# Patient Record
Sex: FEMALE | Race: WHITE | Employment: OTHER | URBAN - METROPOLITAN AREA
[De-identification: names, ages, dates, MRNs, and addresses within clinical notes are randomized per-mention and may not be internally consistent; named-entity substitution may affect disease eponyms.]

---

## 2018-12-05 ENCOUNTER — APPOINTMENT (OUTPATIENT)
Dept: CT IMAGING | Facility: CLINIC | Age: 73
End: 2018-12-05
Attending: EMERGENCY MEDICINE
Payer: MEDICARE

## 2018-12-05 ENCOUNTER — HOSPITAL ENCOUNTER (EMERGENCY)
Facility: CLINIC | Age: 73
Discharge: HOME | End: 2018-12-05
Attending: EMERGENCY MEDICINE | Admitting: EMERGENCY MEDICINE
Payer: MEDICARE

## 2018-12-05 VITALS
DIASTOLIC BLOOD PRESSURE: 80 MMHG | RESPIRATION RATE: 20 BRPM | TEMPERATURE: 97.2 F | SYSTOLIC BLOOD PRESSURE: 146 MMHG | HEART RATE: 68 BPM | OXYGEN SATURATION: 100 %

## 2018-12-05 DIAGNOSIS — R10.12 ABDOMINAL PAIN, LEFT UPPER QUADRANT: ICD-10-CM

## 2018-12-05 DIAGNOSIS — R07.9 CHEST PAIN, UNSPECIFIED TYPE: ICD-10-CM

## 2018-12-05 DIAGNOSIS — K59.00 CONSTIPATION, UNSPECIFIED CONSTIPATION TYPE: ICD-10-CM

## 2018-12-05 LAB
ALBUMIN SERPL-MCNC: 4 G/DL (ref 3.4–5)
ALP SERPL-CCNC: 80 U/L (ref 40–150)
ALT SERPL W P-5'-P-CCNC: 20 U/L (ref 0–50)
ANION GAP SERPL CALCULATED.3IONS-SCNC: 10 MMOL/L (ref 3–14)
AST SERPL W P-5'-P-CCNC: 15 U/L (ref 0–45)
BILIRUB SERPL-MCNC: 0.2 MG/DL (ref 0.2–1.3)
BUN SERPL-MCNC: 16 MG/DL (ref 7–30)
CALCIUM SERPL-MCNC: 8.6 MG/DL (ref 8.5–10.1)
CHLORIDE SERPL-SCNC: 105 MMOL/L (ref 94–109)
CO2 SERPL-SCNC: 22 MMOL/L (ref 20–32)
CREAT BLD-MCNC: 0.8 MG/DL (ref 0.52–1.04)
CREAT SERPL-MCNC: 0.7 MG/DL (ref 0.52–1.04)
ERYTHROCYTE [DISTWIDTH] IN BLOOD BY AUTOMATED COUNT: 14 % (ref 10–15)
GFR SERPL CREATININE-BSD FRML MDRD: 71 ML/MIN/1.7M2
GFR SERPL CREATININE-BSD FRML MDRD: 82 ML/MIN/1.7M2
GLUCOSE SERPL-MCNC: 95 MG/DL (ref 70–99)
HCT VFR BLD AUTO: 41 % (ref 35–47)
HGB BLD-MCNC: 13.2 G/DL (ref 11.7–15.7)
LIPASE SERPL-CCNC: 147 U/L (ref 73–393)
MCH RBC QN AUTO: 31.7 PG (ref 26.5–33)
MCHC RBC AUTO-ENTMCNC: 32.2 G/DL (ref 31.5–36.5)
MCV RBC AUTO: 98 FL (ref 78–100)
PLATELET # BLD AUTO: 348 10E9/L (ref 150–450)
POTASSIUM SERPL-SCNC: 3.5 MMOL/L (ref 3.4–5.3)
PROT SERPL-MCNC: 7.7 G/DL (ref 6.8–8.8)
RBC # BLD AUTO: 4.17 10E12/L (ref 3.8–5.2)
SODIUM SERPL-SCNC: 137 MMOL/L (ref 133–144)
TROPONIN I SERPL-MCNC: <0.015 UG/L (ref 0–0.04)
WBC # BLD AUTO: 7.9 10E9/L (ref 4–11)

## 2018-12-05 PROCEDURE — 93005 ELECTROCARDIOGRAM TRACING: CPT

## 2018-12-05 PROCEDURE — 83690 ASSAY OF LIPASE: CPT | Performed by: EMERGENCY MEDICINE

## 2018-12-05 PROCEDURE — 82565 ASSAY OF CREATININE: CPT | Mod: 91

## 2018-12-05 PROCEDURE — 80053 COMPREHEN METABOLIC PANEL: CPT | Performed by: EMERGENCY MEDICINE

## 2018-12-05 PROCEDURE — 25000128 H RX IP 250 OP 636: Performed by: EMERGENCY MEDICINE

## 2018-12-05 PROCEDURE — 99285 EMERGENCY DEPT VISIT HI MDM: CPT | Mod: 25

## 2018-12-05 PROCEDURE — 84484 ASSAY OF TROPONIN QUANT: CPT | Performed by: EMERGENCY MEDICINE

## 2018-12-05 PROCEDURE — 85027 COMPLETE CBC AUTOMATED: CPT | Performed by: EMERGENCY MEDICINE

## 2018-12-05 PROCEDURE — 71260 CT THORAX DX C+: CPT

## 2018-12-05 PROCEDURE — 96375 TX/PRO/DX INJ NEW DRUG ADDON: CPT

## 2018-12-05 PROCEDURE — 96361 HYDRATE IV INFUSION ADD-ON: CPT

## 2018-12-05 PROCEDURE — 96374 THER/PROPH/DIAG INJ IV PUSH: CPT | Mod: 59

## 2018-12-05 RX ORDER — IOPAMIDOL 755 MG/ML
55 INJECTION, SOLUTION INTRAVASCULAR ONCE
Status: COMPLETED | OUTPATIENT
Start: 2018-12-05 | End: 2018-12-05

## 2018-12-05 RX ORDER — ONDANSETRON 2 MG/ML
4 INJECTION INTRAMUSCULAR; INTRAVENOUS ONCE
Status: COMPLETED | OUTPATIENT
Start: 2018-12-05 | End: 2018-12-05

## 2018-12-05 RX ORDER — HYDROMORPHONE HYDROCHLORIDE 1 MG/ML
0.5 INJECTION, SOLUTION INTRAMUSCULAR; INTRAVENOUS; SUBCUTANEOUS ONCE
Status: COMPLETED | OUTPATIENT
Start: 2018-12-05 | End: 2018-12-05

## 2018-12-05 RX ADMIN — SODIUM CHLORIDE 72 ML: 9 INJECTION, SOLUTION INTRAVENOUS at 20:42

## 2018-12-05 RX ADMIN — SODIUM CHLORIDE 1000 ML: 9 INJECTION, SOLUTION INTRAVENOUS at 20:26

## 2018-12-05 RX ADMIN — Medication 0.5 MG: at 20:25

## 2018-12-05 RX ADMIN — IOPAMIDOL 55 ML: 755 INJECTION, SOLUTION INTRAVENOUS at 20:41

## 2018-12-05 RX ADMIN — ONDANSETRON 4 MG: 2 INJECTION INTRAMUSCULAR; INTRAVENOUS at 20:25

## 2018-12-05 ASSESSMENT — ENCOUNTER SYMPTOMS
NAUSEA: 1
VOMITING: 0
NUMBNESS: 0
ABDOMINAL PAIN: 1
SHORTNESS OF BREATH: 0
WEAKNESS: 0

## 2018-12-05 NOTE — ED AVS SNAPSHOT
[facility] Emergency Department    [address]    [facility] [address]    Phone:  [phone]    Fax:  [phone]                                       Aliyah Gonzalez   MRN: [id number]    Department:  [facility] Emergency Department   Date of Visit:  12/5/2018           After Visit Summary Signature Page     I have received my discharge instructions, and my questions have been answered. I have discussed any challenges I see with this plan with the nurse or doctor.    ..........................................................................................................................................  Patient/Patient Representative Signature      ..........................................................................................................................................  Patient Representative Print Name and Relationship to Patient    ..................................................               ................................................  Date                                   Time    ..........................................................................................................................................  Reviewed by Signature/Title    ...................................................              ..............................................  Date                                               Time          22EPIC Rev 08/18

## 2018-12-05 NOTE — ED AVS SNAPSHOT
[facility] Emergency Department    [address] [place] [address]    Phone:  [phone]    Fax:  [phone]                                       Aliyah Gonzalez   MRN: [id number]    Department:  [facility] Emergency Department   Date of Visit:  12/5/2018           Patient Information     Date Of Birth          1945        Your diagnoses for this visit were:     Chest pain, unspecified type     Abdominal pain, left upper quadrant     Constipation, unspecified constipation type        You were seen by Michele Amador MD.      Follow-up Information     Follow up with Marcia Blackwell MD. Schedule an appointment as soon as possible for a visit in 5 days.    Specialty:  Family Practice    Contact information:    [facility]  [address]   [place] [address]  [phone]          Discharge Instructions       Please take the bottle of magnesium citrate tomorrow to assist with your constipation.  If your stools are not loose, please take MiraLAX 3 times a day until the stool is loose. Then you may return to your normal dose of once daily as needed.    Discharge Instructions  Constipation  Constipation can cause severe cramping pain and your provider thinks this might be the cause of your abdominal pain (belly pain) today.  People usually recognize that they are constipated because they have difficulty having bowel movements, are not having bowel movements frequently enough, or are not having large enough bowel movements. Sometimes, especially in children or older people, you do not recognize that you are constipated until it becomes severe. The most common causes of constipation are a lack of exercise and not eating enough fruits, vegetables, and whole grains. Constipation can also be a side effect of medications, such as narcotics, or may be caused by a disease of the digestive system.    Generally, every Emergency Department visit should  have a follow-up clinic visit with either a primary or a specialty clinic/provider. Please follow-up as instructed by your emergency provider today. Sometimes, chronic constipation requires further testing to determine the cause. If you are over 50 years old, you may need a colonoscopy if you have not had one before.     Return to the Emergency Department if:    Your abdominal pain worsens or does not improve after a bowel movement.    You become very weak.    You get a temperature above 102oF or as directed by your provider.    You have blood in your stools (bright red or black, tarry stools).    You keep vomiting (throwing up) or cannot drink liquids.    Your see blood when you vomit.    Your stomach gets bloated or bigger.    You have new symptoms or anything that worries you.    What can I do to help myself?    If your provider gave you a cathartic medication, like magnesium citrate or GoLytely  (polyethylene glycol), you can expect to have cramps and gas pains after taking it. You can expect to have a number of bowel movements and even diarrhea (loose or watery stools) in the course of clearing your bowels.  You will know your bowels have been cleaned out after you pass clear liquid. The cramps and gas should let up after you have emptied your bowels. You may want to wait until morning to take this type of medication so you aren t up in the night.     Sometimes instead of cathartics, we recommend laxatives like milk of magnesia to move your bowels more slowly, or an enema to help the bowels to move. Read and follow the package directions, or follow your provider s instructions.    Once you have become very constipated, it takes time for your bowels to return to normal and you need to be very careful to prevent becoming constipated again. Take a laxative if you do not move your bowels at least every two days.       Eat foods that have a lot of fiber. Good choices are fruits, vegetables, prune juice, apple juice,  and high fiber cereal. Limit dairy products such as milk and cheese, since these can make constipation worse.     Drink plenty of water.     When you feel the need to go to the bathroom, go to the bathroom. Do not hold it.    Miralax , Metamucil , Colace , Senna or fiber supplements can be used daily.  Miralax  daily is often the best choice for children.  If you were given a prescription for medicine here today, be sure to read all of the information (including the package insert) that comes with your prescription.  This will include important information about the medicine, its side effects, and any warnings that you need to know about.  The pharmacist who fills the prescription can provide more information and answer questions you may have about the medicine.  If you have questions or concerns that the pharmacist cannot address, please call or return to the Emergency Department.   Remember that you can always come back to the Emergency Department if you are not able to see your regular provider in the amount of time listed above, if you get any new symptoms, or if there is anything that worries you.    Discharge Instructions  Chest Pain    You have been seen today for chest pain or discomfort.  At this time, your provider has found no signs that your chest pain is due to a serious or life-threatening condition, (or you have declined more testing and/or admission to the hospital). However, sometimes there is a serious problem that does not show up right away. Your evaluation today may not be complete and you may need further testing and evaluation.     Generally, every Emergency Department visit should have a follow-up clinic visit with either a primary or a specialty clinic/provider. Please follow-up as instructed by your emergency provider today.  Return to the Emergency Department if:    Your chest pain changes, gets worse, starts to happen more often, or comes with less activity.    You are newly short of  breath.    You get very weak or tired.    You pass out or faint.    You have any new symptoms, like fever, cough, numb legs, or you cough up blood.    You have anything else that worries you.    Until you follow-up with your regular provider, please do the following:    Take one aspirin daily unless you have an allergy or are told not to by your provider.    If a stress test appointment has been made, go to the appointment.    If you have questions, contact your regular provider.    Follow-up with your regular provider/clinic as directed; this is very important.    If you were given a prescription for medicine here today, be sure to read all of the information (including the package insert) that comes with your prescription.  This will include important information about the medicine, its side effects, and any warnings that you need to know about.  The pharmacist who fills the prescription can provide more information and answer questions you may have about the medicine.  If you have questions or concerns that the pharmacist cannot address, please call or return to the Emergency Department.       Remember that you can always come back to the Emergency Department if you are not able to see your regular provider in the amount of time listed above, if you get any new symptoms, or if there is anything that worries you.  Discharge Instructions  Abdominal Pain    Abdominal pain (belly pain) can be caused by many things. Your evaluation today does not show the exact cause for your pain. Your provider today has decided that it is unlikely your pain is due to a life threatening problem, or a problem requiring surgery or hospital admission. Sometimes those problems cannot be found right away, so it is very important that you follow up as directed.  Sometimes only the changes which occur over time allow the cause of your pain to be found.    Generally, every Emergency Department visit should have a follow-up clinic visit with  either a primary or a specialty clinic/provider. Please follow-up as instructed by your emergency provider today. With abdominal pain, we often recommend very close follow-up, such as the following day.    ADULTS:  Return to the Emergency Department right away if:      You get an oral temperature above 102oF or as directed by your provider.    You have blood in your stools. This may be bright red or appear as black, tarry stools.      You keep vomiting (throwing up) or cannot drink liquids.    You see blood when you vomit.     You cannot have a bowel movement or you cannot pass gas.    Your stomach gets bloated or bigger.    Your skin or the whites of your eyes look yellow.    You faint.    You have bloody, frequent or painful urination (peeing).    You have new symptoms or anything that worries you.    CHILDREN:  Return to the Emergency Department right away if your child has any of the above-listed symptoms or the following:      Pushes your hand away or screams/cries when his/her belly is touched.    You notice your child is very fussy or weak.    Your child is very tired and is too tired to eat or drink.    Your child is dehydrated.  Signs of dehydration can be:  o Significant change in the amount of wet diapers/urine.  o Your infant or child starts to have dry mouth and lips, or no saliva (spit) or tears.    PREGNANT WOMEN:  Return to the Emergency Department right away if you have any of the above-listed symptoms or the following:      You have bleeding, leaking fluid or passing tissue from the vagina.    You have worse pain or cramping, or pain in your shoulder or back.    You have vomiting that will not stop.    You have a temperature of 100oF or more.    Your baby is not moving as much as usual.    You faint.    You get a bad headache with or without eye problems and abdominal pain.    You have a seizure.    You have unusual discharge from your vagina and abdominal pain.    Abdominal pain is pretty common  "during pregnancy.  Your pain may or may not be related to your pregnancy. You should follow-up closely with your OB provider so they can evaluate you and your baby.  Until you follow-up with your regular provider, do the following:       Avoid sex and do not put anything in your vagina.    Drink clear fluids.    Only take medications approved by your provider.    MORE INFORMATION:    Appendicitis:  A possible cause of abdominal pain in any person who still has their appendix is acute appendicitis. Appendicitis is often hard to diagnose.  Testing does not always rule out early appendicitis or other causes of abdominal pain. Close follow-up with your provider and re-evaluations may be needed to figure out the reason for your abdominal pain.    Follow-up:  It is very important that you make an appointment with your clinic and go to the appointment.  If you do not follow-up with your primary provider, it may result in missing an important development which could result in permanent injury or disability and/or lasting pain.  If there is any problem keeping your appointment, call your provider or return to the Emergency Department.    Medications:  Take your medications as directed by your provider today.  Before using over-the-counter medications, ask your provider and make sure to take the medications as directed.  If you have any questions about medications, ask your provider.    Diet:  Resume your normal diet as much as possible, but do not eat fried, fatty or spicy foods while you have pain.  Do not drink alcohol or have caffeine.  Do not smoke tobacco.    Probiotics: If you have been given an antibiotic, you may want to also take a probiotic pill or eat yogurt with live cultures. Probiotics have \"good bacteria\" to help your intestines stay healthy. Studies have shown that probiotics help prevent diarrhea (loose stools) and other intestine problems (including C. diff infection) when you take antibiotics. You can buy " these without a prescription in the pharmacy section of the store.     If you were given a prescription for medicine here today, be sure to read all of the information (including the package insert) that comes with your prescription.  This will include important information about the medicine, its side effects, and any warnings that you need to know about.  The pharmacist who fills the prescription can provide more information and answer questions you may have about the medicine.  If you have questions or concerns that the pharmacist cannot address, please call or return to the Emergency Department.       Remember that you can always come back to the Emergency Department if you are not able to see your regular provider in the amount of time listed above, if you get any new symptoms, or if there is anything that worries you.      24 Hour Appointment Hotline       To make an appointment at any [facility], call [phone] ([phone]). If you don't have a family doctor or clinic, we will help you find one. [facility] are conveniently located to serve the needs of you and your family.             Review of your medicines      START taking        Dose / Directions Last dose taken    magnesium citrate solution   Dose:  296 mL   Quantity:  296 mL        Take 296 mLs by mouth once for 1 dose   Refills:  0          Our records show that you are taking the medicines listed below. If these are incorrect, please call your family doctor or clinic.        Dose / Directions Last dose taken    acetaminophen 500 MG tablet   Commonly known as:  TYLENOL   Dose:  1000 mg   Quantity:  100 tablet        Take 2 tablets by mouth every 8 hours.   Refills:  0        ATIVAN 0.5 MG tablet   Dose:  0.5 mg   Generic drug:  LORazepam        Take 0.5 mg by mouth every 8 hours as needed.   Refills:  0        CHOLECALCIFEROL        Refills:  0        colchicine 0.6 MG tablet   Commonly known as:  COLCRYS   Quantity:  30 tablet         Take 2 tablets at the first sign of flare, take 1 additional tablet one hour later.   Refills:  0        cycloSPORINE 0.05 % ophthalmic emulsion   Commonly known as:  RESTASIS   Dose:  3 drop        Place 3 drops into both eyes 2 times daily.   Refills:  0        DIFLUCAN 200 MG tablet   Dose:  200 mg   Generic drug:  fluconazole        Take 200 mg by mouth every 7 days. Last filled at [facility] on 6/19/12, #4. Takes on Fridays.   Refills:  0        dihydroergotamine 1 MG/ML injection   Commonly known as:  DHE   Dose:  0.5-1 mg        Inject 0.5-1 mg into the muscle once as needed. May repeat in => 1 hour, max 3 mg/24 hours   Refills:  0        EFFEXOR PO        Take  by mouth 3 times daily.   Refills:  0        FLEXERIL PO        Take  by mouth.   Refills:  0        gabapentin 300 MG capsule   Commonly known as:  NEURONTIN   Dose:  600 mg        Take 600 mg by mouth 2 times daily. Per [facility] pharmacy, Rx written for 3 x 300 mg capsules qAM, 3 x 300 mg capsules at noon, and 4 x 300 mg capsules qHS.   Refills:  0        GAS-X PO   Dose:  125 mg        Take 125 mg by mouth as needed.   Refills:  0        HYDROcodone-acetaminophen 5-325 MG tablet   Commonly known as:  NORCO   Dose:  1-2 tablet   Quantity:  15 tablet        Take 1-2 tablets by mouth every 4 hours as needed for moderate to severe pain   Refills:  0        LEXAPRO PO   Dose:  50 mg        Take 50 mg by mouth daily.   Refills:  0        lisinopril 20 MG tablet   Commonly known as:  PRINIVIL/ZESTRIL   Dose:  20 mg        Take 20 mg by mouth daily.   Refills:  0        OMEGA-3 FISH OIL PO        Take  by mouth.   Refills:  0        oxyCODONE-acetaminophen 5-325 MG tablet   Commonly known as:  PERCOCET   Dose:  1 tablet   Quantity:  15 tablet        Take 1 tablet by mouth every 6 hours as needed for moderate to severe pain   Refills:  0        pantoprazole 40 MG EC tablet   Commonly known as:  PROTONIX   Dose:  40 mg        Take 40 mg by mouth every  morning (before breakfast).   Refills:  0        propranolol 20 MG tablet   Commonly known as:  INDERAL   Dose:  20 mg        Take 20 mg by mouth 4 times daily as needed. For tremors   Refills:  0        traMADol 50 MG tablet   Commonly known as:  ULTRAM   Dose:  50 mg        Take 50 mg by mouth every 6 hours as needed.   Refills:  0        traZODone 150 MG tablet   Commonly known as:  DESYREL   Dose:  150 mg        Take 150 mg by mouth nightly as needed.   Refills:  0        * vitamin B-12 1000 MCG/ML injection   Commonly known as:  CYANOCOBALAMIN   Dose:  1 mL        Inject 1 mL as directed every 30 days. Per patient, Due at the end of the month   Refills:  0        * VITAMIN B 12 PO        Take  by mouth.   Refills:  0        ZOFRAN PO        Take  by mouth.   Refills:  0        * Notice:  This list has 2 medication(s) that are the same as other medications prescribed for you. Read the directions carefully, and ask your doctor or other care provider to review them with you.            Prescriptions were sent or printed at these locations (1 Prescription)                   Other Prescriptions                Printed at Department/Unit printer (1 of 1)         magnesium citrate solution                Procedures and tests performed during your visit     CBC (platelets, no diff)    CT Chest (PE) Abdomen Pelvis w Contrast    Comprehensive metabolic panel    Creatinine POCT    EKG 12 lead    ISTAT creatinine    Lipase    Troponin I      Orders Needing Specimen Collection     None      Pending Results     Date and Time Order Name Status Description    12/5/2018 1957 EKG 12 lead Preliminary             Pending Culture Results     No orders found from 12/3/2018 to 12/6/2018.            Pending Results Instructions     If you had any lab results that were not finalized at the time of your Discharge, you can call the ED Lab Result RN at [phone]. You will be contacted by this team for any positive Lab results or changes  in treatment. The nurses are available 7 days a week from 10A to 6:30P.  You can leave a message 24 hours per day and they will return your call.        Test Results From Your Hospital Stay        12/5/2018  8:52 PM      Component Results     Component Value Ref Range & Units Status    Sodium 137 133 - 144 mmol/L Final    Potassium 3.5 3.4 - 5.3 mmol/L Final    Chloride 105 94 - 109 mmol/L Final    Carbon Dioxide 22 20 - 32 mmol/L Final    Anion Gap 10 3 - 14 mmol/L Final    Glucose 95 70 - 99 mg/dL Final    Urea Nitrogen 16 7 - 30 mg/dL Final    Creatinine 0.70 0.52 - 1.04 mg/dL Final    GFR Estimate 82 >60 mL/min/1.7m2 Final    Non  GFR Calc    GFR Estimate If Black >90 >60 mL/min/1.7m2 Final    African American GFR Calc    Calcium 8.6 8.5 - 10.1 mg/dL Final    Bilirubin Total 0.2 0.2 - 1.3 mg/dL Final    Albumin 4.0 3.4 - 5.0 g/dL Final    Protein Total 7.7 6.8 - 8.8 g/dL Final    Alkaline Phosphatase 80 40 - 150 U/L Final    ALT 20 0 - 50 U/L Final    AST 15 0 - 45 U/L Final         12/5/2018  8:52 PM      Component Results     Component Value Ref Range & Units Status    Lipase 147 73 - 393 U/L Final         12/5/2018  8:37 PM      Component Results     Component Value Ref Range & Units Status    WBC 7.9 4.0 - 11.0 10e9/L Final    RBC Count 4.17 3.8 - 5.2 10e12/L Final    Hemoglobin 13.2 11.7 - 15.7 g/dL Final    Hematocrit 41.0 35.0 - 47.0 % Final    MCV 98 78 - 100 fl Final    MCH 31.7 26.5 - 33.0 pg Final    MCHC 32.2 31.5 - 36.5 g/dL Final    RDW 14.0 10.0 - 15.0 % Final    Platelet Count 348 150 - 450 10e9/L Final               12/5/2018  8:36 PM      Component Results     Component Value Ref Range & Units Status    Creatinine 0.8 0.52 - 1.04 mg/dL Final    GFR Estimate 71 >60 mL/min/1.7m2 Final    GFR Estimate If Black 86 >60 mL/min/1.7m2 Final         12/5/2018  9:34 PM      Narrative     CT CHEST PE ABDOMEN AND PELVIS WITH CONTRAST   12/5/2018 8:50 PM     HISTORY: Left lower chest pain  and left upper quadrant abdominal pain.    TECHNIQUE:  55 mL Isovue-370. Radiation dose for this scan was reduced  using automated exposure control, adjustment of the mA and/or kV  according to patient size, or iterative reconstruction technique.    COMPARISON: 6/28/2012    FINDINGS:     Chest: No evidence of pulmonary embolism. Aorta is not opacified above  the level of left atrium and so aortic dissection cannot be ruled out  in that area, but there is no evidence of dissection at the root of  the aorta or in the more distal descending aorta. Mild groundglass  density and linear atelectasis is seen at right lung base and  otherwise lungs are clear. The heart and mediastinum appear normal.    Abdomen: Large amount of stool in the colon consistent with  constipation. Normal appearing liver. Intrahepatic and extrahepatic  biliary dilatation status post cholecystectomy. Normal spleen,  pancreas, left kidney and adrenal glands. There is a 1 cm cyst off the  lateral aspect of the lower pole right kidney and otherwise the right  kidney appears normal. No adenopathy, free air or free fluid.  Postoperative changes in the stomach and small bowel suggest gastric  bypass and Sherrell-en-Y anastomosis. Otherwise the small bowel is  unremarkable.    Pelvis: Bladder appears normal. Previous hysterectomy. Ovaries are not  identified. Lumbar degenerative changes and scoliosis.        Impression     IMPRESSION:  1. Large amount of stool in the colon consistent with constipation.  2. Right lower pole renal cyst.  3. Postoperative changes in the stomach and small bowel suggest  gastric bypass Sherrell-en-Y anastomosis.  4. Cholecystectomy and hysterectomy.  5. No evidence of pulmonary embolism.    CONSTANTIN DONG MD         12/5/2018  9:19 PM      Component Results     Component Value Ref Range & Units Status    Troponin I ES <0.015 0.000 - 0.045 ug/L Final    The 99th percentile for upper reference range is 0.045 ug/L.  Troponin values   in  the range of 0.045 - 0.120 ug/L may be associated with risks of adverse   clinical events.                  Clinical Quality Measure: Blood Pressure Screening     Your blood pressure was checked while you were in the emergency department today. The last reading we obtained was  BP: 139/76 . Please read the guidelines below about what these numbers mean and what you should do about them.  If your systolic blood pressure (the top number) is less than 120 and your diastolic blood pressure (the bottom number) is less than 80, then your blood pressure is normal. There is nothing more that you need to do about it.  If your systolic blood pressure (the top number) is 120-139 or your diastolic blood pressure (the bottom number) is 80-89, your blood pressure may be higher than it should be. You should have your blood pressure rechecked within a year by a primary care provider.  If your systolic blood pressure (the top number) is 140 or greater or your diastolic blood pressure (the bottom number) is 90 or greater, you may have high blood pressure. High blood pressure is treatable, but if left untreated over time it can put you at risk for heart attack, stroke, or kidney failure. You should have your blood pressure rechecked by a primary care provider within the next 4 weeks.  If your provider in the emergency department today gave you specific instructions to follow-up with your doctor or provider even sooner than that, you should follow that instruction and not wait for up to 4 weeks for your follow-up visit.        Thank you for choosing [facility]       Thank you for choosing [facility] for your care. Our goal is always to provide you with excellent care. Hearing back from our patients is one way we can continue to improve our services. Please take a few minutes to complete the written survey that you may receive in the mail after you visit with us. Thank you!        [employer] Information     [employer] lets you send messages to  "your doctor, view your test results, renew your prescriptions, schedule appointments and more. To sign up, go to [email or web address] . Click on \"Log in\" on the left side of the screen, which will take you to the Welcome page. Then click on \"Sign up Now\" on the right side of the page.     You will be asked to enter the access code listed below, as well as some personal information. Please follow the directions to create your username and password.     Your access code is: [email or web address]  Expires: 3/5/2019 10:03 PM     Your access code will  in 90 days. If you need help or a new code, please call your [place] clinic or [phone].        Care EveryWhere ID     This is your Care EveryWhere ID. This could be used by other organizations to access your [place] medical records  [id number]        Equal Access to Services     [facility] Center[facility] : Meli Delatorre, jem gallardo, rhoda jimenez, claire negron . So [facility] [phone].    ATENCIÓN: Si habla español, tiene a buckley disposición servicios gratuitos de asistencia lingüística. Llame al [phone].    We comply with applicable federal civil rights laws and [place] laws. We do not discriminate on the basis of race, color, national origin, age, disability, sex, sexual orientation, or gender identity.            After Visit Summary       This is your record. Keep this with you and show to your community pharmacist(s) and doctor(s) at your next visit.                  "

## 2018-12-06 LAB — INTERPRETATION ECG - MUSE: NORMAL

## 2018-12-06 NOTE — ED TRIAGE NOTES
Left sided chest pain. Radiates into back, left groin and into left side of neck. Started this morning, upon waking. Took gas X without relief.

## 2018-12-06 NOTE — DISCHARGE INSTRUCTIONS
Please take the bottle of magnesium citrate tomorrow to assist with your constipation.  If your stools are not loose, please take MiraLAX 3 times a day until the stool is loose. Then you may return to your normal dose of once daily as needed.    Discharge Instructions  Constipation  Constipation can cause severe cramping pain and your provider thinks this might be the cause of your abdominal pain (belly pain) today.  People usually recognize that they are constipated because they have difficulty having bowel movements, are not having bowel movements frequently enough, or are not having large enough bowel movements. Sometimes, especially in children or older people, you do not recognize that you are constipated until it becomes severe. The most common causes of constipation are a lack of exercise and not eating enough fruits, vegetables, and whole grains. Constipation can also be a side effect of medications, such as narcotics, or may be caused by a disease of the digestive system.    Generally, every Emergency Department visit should have a follow-up clinic visit with either a primary or a specialty clinic/provider. Please follow-up as instructed by your emergency provider today. Sometimes, chronic constipation requires further testing to determine the cause. If you are over 50 years old, you may need a colonoscopy if you have not had one before.     Return to the Emergency Department if:    Your abdominal pain worsens or does not improve after a bowel movement.    You become very weak.    You get a temperature above 102oF or as directed by your provider.    You have blood in your stools (bright red or black, tarry stools).    You keep vomiting (throwing up) or cannot drink liquids.    Your see blood when you vomit.    Your stomach gets bloated or bigger.    You have new symptoms or anything that worries you.    What can I do to help myself?    If your provider gave you a cathartic medication, like magnesium citrate  or GoLytely  (polyethylene glycol), you can expect to have cramps and gas pains after taking it. You can expect to have a number of bowel movements and even diarrhea (loose or watery stools) in the course of clearing your bowels.  You will know your bowels have been cleaned out after you pass clear liquid. The cramps and gas should let up after you have emptied your bowels. You may want to wait until morning to take this type of medication so you aren t up in the night.     Sometimes instead of cathartics, we recommend laxatives like milk of magnesia to move your bowels more slowly, or an enema to help the bowels to move. Read and follow the package directions, or follow your provider s instructions.    Once you have become very constipated, it takes time for your bowels to return to normal and you need to be very careful to prevent becoming constipated again. Take a laxative if you do not move your bowels at least every two days.       Eat foods that have a lot of fiber. Good choices are fruits, vegetables, prune juice, apple juice, and high fiber cereal. Limit dairy products such as milk and cheese, since these can make constipation worse.     Drink plenty of water.     When you feel the need to go to the bathroom, go to the bathroom. Do not hold it.    Miralax , Metamucil , Colace , Senna or fiber supplements can be used daily.  Miralax  daily is often the best choice for children.  If you were given a prescription for medicine here today, be sure to read all of the information (including the package insert) that comes with your prescription.  This will include important information about the medicine, its side effects, and any warnings that you need to know about.  The pharmacist who fills the prescription can provide more information and answer questions you may have about the medicine.  If you have questions or concerns that the pharmacist cannot address, please call or return to the Emergency Department.    Remember that you can always come back to the Emergency Department if you are not able to see your regular provider in the amount of time listed above, if you get any new symptoms, or if there is anything that worries you.    Discharge Instructions  Chest Pain    You have been seen today for chest pain or discomfort.  At this time, your provider has found no signs that your chest pain is due to a serious or life-threatening condition, (or you have declined more testing and/or admission to the hospital). However, sometimes there is a serious problem that does not show up right away. Your evaluation today may not be complete and you may need further testing and evaluation.     Generally, every Emergency Department visit should have a follow-up clinic visit with either a primary or a specialty clinic/provider. Please follow-up as instructed by your emergency provider today.  Return to the Emergency Department if:    Your chest pain changes, gets worse, starts to happen more often, or comes with less activity.    You are newly short of breath.    You get very weak or tired.    You pass out or faint.    You have any new symptoms, like fever, cough, numb legs, or you cough up blood.    You have anything else that worries you.    Until you follow-up with your regular provider, please do the following:    Take one aspirin daily unless you have an allergy or are told not to by your provider.    If a stress test appointment has been made, go to the appointment.    If you have questions, contact your regular provider.    Follow-up with your regular provider/clinic as directed; this is very important.    If you were given a prescription for medicine here today, be sure to read all of the information (including the package insert) that comes with your prescription.  This will include important information about the medicine, its side effects, and any warnings that you need to know about.  The pharmacist who fills the  prescription can provide more information and answer questions you may have about the medicine.  If you have questions or concerns that the pharmacist cannot address, please call or return to the Emergency Department.       Remember that you can always come back to the Emergency Department if you are not able to see your regular provider in the amount of time listed above, if you get any new symptoms, or if there is anything that worries you.  Discharge Instructions  Abdominal Pain    Abdominal pain (belly pain) can be caused by many things. Your evaluation today does not show the exact cause for your pain. Your provider today has decided that it is unlikely your pain is due to a life threatening problem, or a problem requiring surgery or hospital admission. Sometimes those problems cannot be found right away, so it is very important that you follow up as directed.  Sometimes only the changes which occur over time allow the cause of your pain to be found.    Generally, every Emergency Department visit should have a follow-up clinic visit with either a primary or a specialty clinic/provider. Please follow-up as instructed by your emergency provider today. With abdominal pain, we often recommend very close follow-up, such as the following day.    ADULTS:  Return to the Emergency Department right away if:      You get an oral temperature above 102oF or as directed by your provider.    You have blood in your stools. This may be bright red or appear as black, tarry stools.      You keep vomiting (throwing up) or cannot drink liquids.    You see blood when you vomit.     You cannot have a bowel movement or you cannot pass gas.    Your stomach gets bloated or bigger.    Your skin or the whites of your eyes look yellow.    You faint.    You have bloody, frequent or painful urination (peeing).    You have new symptoms or anything that worries you.    CHILDREN:  Return to the Emergency Department right away if your child has  any of the above-listed symptoms or the following:      Pushes your hand away or screams/cries when his/her belly is touched.    You notice your child is very fussy or weak.    Your child is very tired and is too tired to eat or drink.    Your child is dehydrated.  Signs of dehydration can be:  o Significant change in the amount of wet diapers/urine.  o Your infant or child starts to have dry mouth and lips, or no saliva (spit) or tears.    PREGNANT WOMEN:  Return to the Emergency Department right away if you have any of the above-listed symptoms or the following:      You have bleeding, leaking fluid or passing tissue from the vagina.    You have worse pain or cramping, or pain in your shoulder or back.    You have vomiting that will not stop.    You have a temperature of 100oF or more.    Your baby is not moving as much as usual.    You faint.    You get a bad headache with or without eye problems and abdominal pain.    You have a seizure.    You have unusual discharge from your vagina and abdominal pain.    Abdominal pain is pretty common during pregnancy.  Your pain may or may not be related to your pregnancy. You should follow-up closely with your OB provider so they can evaluate you and your baby.  Until you follow-up with your regular provider, do the following:       Avoid sex and do not put anything in your vagina.    Drink clear fluids.    Only take medications approved by your provider.    MORE INFORMATION:    Appendicitis:  A possible cause of abdominal pain in any person who still has their appendix is acute appendicitis. Appendicitis is often hard to diagnose.  Testing does not always rule out early appendicitis or other causes of abdominal pain. Close follow-up with your provider and re-evaluations may be needed to figure out the reason for your abdominal pain.    Follow-up:  It is very important that you make an appointment with your clinic and go to the appointment.  If you do not follow-up with  "your primary provider, it may result in missing an important development which could result in permanent injury or disability and/or lasting pain.  If there is any problem keeping your appointment, call your provider or return to the Emergency Department.    Medications:  Take your medications as directed by your provider today.  Before using over-the-counter medications, ask your provider and make sure to take the medications as directed.  If you have any questions about medications, ask your provider.    Diet:  Resume your normal diet as much as possible, but do not eat fried, fatty or spicy foods while you have pain.  Do not drink alcohol or have caffeine.  Do not smoke tobacco.    Probiotics: If you have been given an antibiotic, you may want to also take a probiotic pill or eat yogurt with live cultures. Probiotics have \"good bacteria\" to help your intestines stay healthy. Studies have shown that probiotics help prevent diarrhea (loose stools) and other intestine problems (including C. diff infection) when you take antibiotics. You can buy these without a prescription in the pharmacy section of the store.     If you were given a prescription for medicine here today, be sure to read all of the information (including the package insert) that comes with your prescription.  This will include important information about the medicine, its side effects, and any warnings that you need to know about.  The pharmacist who fills the prescription can provide more information and answer questions you may have about the medicine.  If you have questions or concerns that the pharmacist cannot address, please call or return to the Emergency Department.       Remember that you can always come back to the Emergency Department if you are not able to see your regular provider in the amount of time listed above, if you get any new symptoms, or if there is anything that worries you.    "

## 2018-12-06 NOTE — ED PROVIDER NOTES
History     Chief Complaint:  Chest pain     HPI   Aliyah Gonzalez is a 73 year old female, with a history of hypertension, who presents with chest pain. The patient states that she had woken up this morning at 1000 with severe left sided chest and left upper abdominal pain and radiated into the back, groin and neck that lasted approximately a minute in length. She notes that she never had complete resolution of her pain has the pain has since been constant although less severe. She had taken 3 Gas-X, but continued to have no resolution of pain, prompting her ED visit.  Here, the patient states that the pain is severe in the left breast and just inferior. She notes that the pain increases with jarring movements and breathing. She endorses nausea, but denies any shortness of breath, vomiting, numbness, or weakness.     Cardiac/PE/DVT Risk Factors:  The patient has a history of hypertension, but no history of hyperlipidemia, diabetes, or smoking. The patient denies any personal or familial history of PE, DVT, or clotting disorder. The patient reports no recent travel, surgery, or other immobilizations. She denies estrogen use or personal history of cancer.       Allergies:  Ciprofloxacin  Codeine Sulfate  Propoxyphene  Sulfa Drugs      Medications:    Colchicine  Lexapro  Fluconazole  Lisinopril  Ativan  Protonix  Gas-X  Trazodone  Effexor    Past Medical History:    Arthritis  Hypertension  Celiac's disease     Past Surgical History:    Appendectomy  EGD combined  Gastric bypass  Herniorrhaphy hiatal      Family History:    Colorectal cancer    Social History:  Presents with her significant other.  Negative for alcohol use.  Negative for tobacco use.   Marijuana user.   Marital Status:   [2]     Review of Systems   Respiratory: Negative for shortness of breath.    Cardiovascular: Positive for chest pain.   Gastrointestinal: Positive for abdominal pain and nausea. Negative for vomiting.   Neurological:  Negative for weakness and numbness.   All other systems reviewed and are negative.    Physical Exam   First Vitals:  BP: 162/85  Pulse: 68  Heart Rate: 68  Temp: 97.2  F (36.2  C)  Resp: 18  SpO2: 100 %    Physical Exam    General:   Pleasant, age appropriate female resting comfortably in the bed.  HEENT:    Oropharynx is moist  Eyes:    Conjunctiva normal  Neck:    Supple, no meningismus.     CV:     Regular rate and rhythm.      No murmurs, rubs or gallops.       2+ radial pulses bilateral.       No lower extremity edema.  PULM:    Clear to auscultation bilateral.       No respiratory distress.      Good air exchange.     No rales or wheezing.     No stridor.  ABD:    Soft, non-distended.       Mild focal LUQ tenderness     No splenomegaly     No pulsatile masses.       No rebound, guarding or rigidity.  MSK:     No gross deformity to all four extremities.   LYMPH:   No cervical lymphadenopathy.  NEURO:   Alert. Good muscle tone, no atrophy.  Skin:    Warm, dry and intact.    Psych:    Mood is good and affect is appropriate.        Emergency Department Course   ECG:  Indication: chest pain   Time: 2005  Vent. Rate 61 bpm. NE interval 180. QRS duration 92. QT/QTc 490/493. P-R-T axis 38 31 49. Normal sinus rhythm. Prolonged QT. Abnormal ECG. Read time: 2009.      Imaging:  Radiographic findings were communicated with the patient who voiced understanding of the findings.  CT Chest/Abdomen/Pelvis w IV contrast:   1. Large amount of stool in the colon consistent with constipation.  2. Right lower pole renal cyst.  3. Postoperative changes in the stomach and small bowel suggest  gastric bypass Sherrell-en-Y anastomosis.  4. Cholecystectomy and hysterectomy.  5. No evidence of pulmonary embolism As per radiology.     Laboratory:  CBC: WBC: 7.9, HGB: 13.2, PLT: 348  CMP: all WNL (Creatinine: 0.70)    Lipase: 147  Troponin: <0.015  Creatinine POCT: all WNL    Interventions:  2025 Zofran, 4 mg, IV  2025 Dilaudid, 0.5 mg,  IV   0.9% Sodium Chloride Bolus, 1L, IV      Emergency Department Course:  Nursing notes and vitals reviewed. EKG was obtained, findings above.        I performed an exam of the patient as documented above.     IV inserted. Medicine administered as documented above. Blood drawn. This was sent to the lab for further testing, results above.    The patient was sent for a chest, abdomen, and pelvis CT while in the emergency department, findings above.      I rechecked the patient and discussed the results of her workup thus far.     Findings and plan explained to the Patient. Patient discharged home with instructions regarding supportive care, medications, and reasons to return. The importance of close follow-up was reviewed. The patient was prescribed Magnesium citrate.     I personally reviewed the laboratory results with the Patient and answered all related questions prior to discharge.       Impression & Plan      Medical Decision Makin-year-old female presented to the ED with left lower chest and left upper quadrant abdominal pain.  She was evaluated for ACS in which EKG shows no acute ischemic changes.  Symptoms been constant for greater than 6 hours with a normal troponin thus ruling out MI.  CT scan of the chest/abdomen/pelvis was undertaken which reveals no evidence of pulmonary embolus or lower lobe pulmonary pathology.  Intra-abdominal findings were unremarkable other than significant stool burden including to the colon at the splenic flexure.  Marked constipation is the likely source of the patient's symptoms.  She will be discharged home on magnesium citrate.  If she does not have dramatic improvement, she will take MiraLAX 3 times a day until stools are loose then once daily as needed.  Patient to return to ED for any worsening symptoms and closely follow-up with primary care physician.    Diagnosis:    ICD-10-CM   1. Chest pain, unspecified type R07.9   2. Abdominal pain, left upper  quadrant R10.12   3. Constipation, unspecified constipation type K59.00       Disposition:  discharged to home    Discharge Medications:  New Prescriptions    MAGNESIUM CITRATE SOLUTION    Take 296 mLs by mouth once for 1 dose     I, Tanya Colbert, am serving as a scribe on 12/5/2018 at 8:11 PM to personally document services performed by Michele Amador MD based on my observations and the provider's statements to me.      Tanya Colbert  12/5/2018   [facility] EMERGENCY DEPARTMENT       Michele Amador MD  12/05/18 [id number]

## 2020-03-31 ENCOUNTER — APPOINTMENT (OUTPATIENT)
Dept: CT IMAGING | Facility: CLINIC | Age: 75
End: 2020-03-31
Attending: PHYSICIAN ASSISTANT
Payer: MEDICARE

## 2020-03-31 ENCOUNTER — HOSPITAL ENCOUNTER (EMERGENCY)
Facility: CLINIC | Age: 75
Discharge: HOME | End: 2020-03-31
Attending: PHYSICIAN ASSISTANT | Admitting: PHYSICIAN ASSISTANT
Payer: MEDICARE

## 2020-03-31 VITALS
DIASTOLIC BLOOD PRESSURE: 88 MMHG | WEIGHT: 109 LBS | RESPIRATION RATE: 20 BRPM | HEART RATE: 63 BPM | TEMPERATURE: 98.4 F | SYSTOLIC BLOOD PRESSURE: 152 MMHG | OXYGEN SATURATION: 99 % | BODY MASS INDEX: 18.61 KG/M2 | HEIGHT: 64 IN

## 2020-03-31 DIAGNOSIS — E87.6 HYPOKALEMIA: ICD-10-CM

## 2020-03-31 DIAGNOSIS — K52.9 COLITIS: ICD-10-CM

## 2020-03-31 DIAGNOSIS — R63.8 DECREASED ORAL INTAKE: ICD-10-CM

## 2020-03-31 DIAGNOSIS — E87.1 HYPONATREMIA: ICD-10-CM

## 2020-03-31 LAB
ALBUMIN SERPL-MCNC: 3.7 G/DL (ref 3.4–5)
ALBUMIN UR-MCNC: NEGATIVE MG/DL
ALP SERPL-CCNC: 95 U/L (ref 40–150)
ALT SERPL W P-5'-P-CCNC: 20 U/L (ref 0–50)
ANION GAP SERPL CALCULATED.3IONS-SCNC: 9 MMOL/L (ref 3–14)
APPEARANCE UR: CLEAR
AST SERPL W P-5'-P-CCNC: 12 U/L (ref 0–45)
BASOPHILS # BLD AUTO: 0 10E9/L (ref 0–0.2)
BASOPHILS NFR BLD AUTO: 0.3 %
BILIRUB SERPL-MCNC: 0.2 MG/DL (ref 0.2–1.3)
BILIRUB UR QL STRIP: NEGATIVE
BUN SERPL-MCNC: 11 MG/DL (ref 7–30)
CALCIUM SERPL-MCNC: 8.9 MG/DL (ref 8.5–10.1)
CHLORIDE SERPL-SCNC: 99 MMOL/L (ref 94–109)
CO2 SERPL-SCNC: 20 MMOL/L (ref 20–32)
COLOR UR AUTO: NORMAL
CREAT BLD-MCNC: 0.8 MG/DL (ref 0.52–1.04)
CREAT SERPL-MCNC: 0.77 MG/DL (ref 0.52–1.04)
DIFFERENTIAL METHOD BLD: ABNORMAL
EOSINOPHIL # BLD AUTO: 0.1 10E9/L (ref 0–0.7)
EOSINOPHIL NFR BLD AUTO: 0.9 %
ERYTHROCYTE [DISTWIDTH] IN BLOOD BY AUTOMATED COUNT: 13.8 % (ref 10–15)
GFR SERPL CREATININE-BSD FRML MDRD: 70 ML/MIN/{1.73_M2}
GFR SERPL CREATININE-BSD FRML MDRD: 76 ML/MIN/{1.73_M2}
GLUCOSE SERPL-MCNC: 124 MG/DL (ref 70–99)
GLUCOSE UR STRIP-MCNC: NEGATIVE MG/DL
HCT VFR BLD AUTO: 38.9 % (ref 35–47)
HGB BLD-MCNC: 13.1 G/DL (ref 11.7–15.7)
HGB UR QL STRIP: NEGATIVE
HYALINE CASTS #/AREA URNS LPF: 1 /LPF (ref 0–2)
IMM GRANULOCYTES # BLD: 0 10E9/L (ref 0–0.4)
IMM GRANULOCYTES NFR BLD: 0.5 %
KETONES UR STRIP-MCNC: NEGATIVE MG/DL
LEUKOCYTE ESTERASE UR QL STRIP: NEGATIVE
LIPASE SERPL-CCNC: 367 U/L (ref 73–393)
LYMPHOCYTES # BLD AUTO: 1.7 10E9/L (ref 0.8–5.3)
LYMPHOCYTES NFR BLD AUTO: 21.8 %
MCH RBC QN AUTO: 31.8 PG (ref 26.5–33)
MCHC RBC AUTO-ENTMCNC: 33.7 G/DL (ref 31.5–36.5)
MCV RBC AUTO: 94 FL (ref 78–100)
MONOCYTES # BLD AUTO: 0.4 10E9/L (ref 0–1.3)
MONOCYTES NFR BLD AUTO: 5.4 %
NEUTROPHILS # BLD AUTO: 5.7 10E9/L (ref 1.6–8.3)
NEUTROPHILS NFR BLD AUTO: 71.1 %
NITRATE UR QL: NEGATIVE
NRBC # BLD AUTO: 0 10*3/UL
NRBC BLD AUTO-RTO: 0 /100
PH UR STRIP: 6.5 PH (ref 5–7)
PLATELET # BLD AUTO: 466 10E9/L (ref 150–450)
POTASSIUM SERPL-SCNC: 3.1 MMOL/L (ref 3.4–5.3)
PROT SERPL-MCNC: 7.6 G/DL (ref 6.8–8.8)
RBC # BLD AUTO: 4.12 10E12/L (ref 3.8–5.2)
RBC #/AREA URNS AUTO: 0 /HPF (ref 0–2)
SODIUM SERPL-SCNC: 128 MMOL/L (ref 133–144)
SOURCE: NORMAL
SP GR UR STRIP: 1 (ref 1–1.03)
SQUAMOUS #/AREA URNS AUTO: <1 /HPF (ref 0–1)
UROBILINOGEN UR STRIP-MCNC: NORMAL MG/DL (ref 0–2)
WBC # BLD AUTO: 8.2 10E9/L (ref 4–11)
WBC #/AREA URNS AUTO: <1 /HPF (ref 0–5)

## 2020-03-31 PROCEDURE — 74177 CT ABD & PELVIS W/CONTRAST: CPT

## 2020-03-31 PROCEDURE — 83690 ASSAY OF LIPASE: CPT | Performed by: PHYSICIAN ASSISTANT

## 2020-03-31 PROCEDURE — 81001 URINALYSIS AUTO W/SCOPE: CPT | Performed by: PHYSICIAN ASSISTANT

## 2020-03-31 PROCEDURE — 85025 COMPLETE CBC W/AUTO DIFF WBC: CPT | Performed by: PHYSICIAN ASSISTANT

## 2020-03-31 PROCEDURE — 80053 COMPREHEN METABOLIC PANEL: CPT | Performed by: PHYSICIAN ASSISTANT

## 2020-03-31 PROCEDURE — 25000125 ZZHC RX 250: Performed by: PHYSICIAN ASSISTANT

## 2020-03-31 PROCEDURE — 99285 EMERGENCY DEPT VISIT HI MDM: CPT | Mod: 25

## 2020-03-31 PROCEDURE — 25000128 H RX IP 250 OP 636

## 2020-03-31 PROCEDURE — 25800030 ZZH RX IP 258 OP 636: Performed by: PHYSICIAN ASSISTANT

## 2020-03-31 PROCEDURE — 25000128 H RX IP 250 OP 636: Performed by: PHYSICIAN ASSISTANT

## 2020-03-31 PROCEDURE — 96375 TX/PRO/DX INJ NEW DRUG ADDON: CPT

## 2020-03-31 PROCEDURE — 96365 THER/PROPH/DIAG IV INF INIT: CPT | Mod: 59

## 2020-03-31 PROCEDURE — 96361 HYDRATE IV INFUSION ADD-ON: CPT

## 2020-03-31 PROCEDURE — 82565 ASSAY OF CREATININE: CPT

## 2020-03-31 PROCEDURE — 96366 THER/PROPH/DIAG IV INF ADDON: CPT

## 2020-03-31 PROCEDURE — 25000132 ZZH RX MED GY IP 250 OP 250 PS 637: Mod: GY | Performed by: PHYSICIAN ASSISTANT

## 2020-03-31 PROCEDURE — 96376 TX/PRO/DX INJ SAME DRUG ADON: CPT

## 2020-03-31 RX ORDER — NYSTATIN 100000/ML
500000 SUSPENSION, ORAL (FINAL DOSE FORM) ORAL 4 TIMES DAILY
Qty: 100 ML | Refills: 0 | Status: SHIPPED | OUTPATIENT
Start: 2020-03-31 | End: 2023-04-17

## 2020-03-31 RX ORDER — ONDANSETRON 2 MG/ML
4 INJECTION INTRAMUSCULAR; INTRAVENOUS ONCE
Status: COMPLETED | OUTPATIENT
Start: 2020-03-31 | End: 2020-03-31

## 2020-03-31 RX ORDER — IOPAMIDOL 755 MG/ML
54 INJECTION, SOLUTION INTRAVASCULAR ONCE
Status: COMPLETED | OUTPATIENT
Start: 2020-03-31 | End: 2020-03-31

## 2020-03-31 RX ORDER — POTASSIUM CHLORIDE 1500 MG/1
40 TABLET, EXTENDED RELEASE ORAL ONCE
Status: DISCONTINUED | OUTPATIENT
Start: 2020-03-31 | End: 2020-03-31

## 2020-03-31 RX ORDER — ONDANSETRON 4 MG/1
4 TABLET, FILM COATED ORAL EVERY 8 HOURS PRN
Qty: 6 TABLET | Refills: 0 | Status: SHIPPED | OUTPATIENT
Start: 2020-03-31 | End: 2023-04-17

## 2020-03-31 RX ORDER — POTASSIUM CL/LIDO/0.9 % NACL 10MEQ/0.1L
10 INTRAVENOUS SOLUTION, PIGGYBACK (ML) INTRAVENOUS
Status: DISCONTINUED | OUTPATIENT
Start: 2020-03-31 | End: 2020-03-31 | Stop reason: HOSPADM

## 2020-03-31 RX ORDER — ONDANSETRON 2 MG/ML
INJECTION INTRAMUSCULAR; INTRAVENOUS
Status: COMPLETED
Start: 2020-03-31 | End: 2020-03-31

## 2020-03-31 RX ORDER — MORPHINE SULFATE 2 MG/ML
2 INJECTION, SOLUTION INTRAMUSCULAR; INTRAVENOUS
Status: DISCONTINUED | OUTPATIENT
Start: 2020-03-31 | End: 2020-03-31 | Stop reason: HOSPADM

## 2020-03-31 RX ORDER — POTASSIUM CHLORIDE 1.5 G/1.58G
40 POWDER, FOR SOLUTION ORAL ONCE
Status: COMPLETED | OUTPATIENT
Start: 2020-03-31 | End: 2020-03-31

## 2020-03-31 RX ADMIN — MORPHINE SULFATE 2 MG: 2 INJECTION, SOLUTION INTRAMUSCULAR; INTRAVENOUS at 15:56

## 2020-03-31 RX ADMIN — Medication 10 MEQ: at 16:40

## 2020-03-31 RX ADMIN — SODIUM CHLORIDE 500 ML: 9 INJECTION, SOLUTION INTRAVENOUS at 15:55

## 2020-03-31 RX ADMIN — IOPAMIDOL 54 ML: 755 INJECTION, SOLUTION INTRAVENOUS at 16:09

## 2020-03-31 RX ADMIN — LIDOCAINE HYDROCHLORIDE 30 ML: 20 SOLUTION ORAL; TOPICAL at 17:01

## 2020-03-31 RX ADMIN — SODIUM CHLORIDE 60 ML: 9 INJECTION, SOLUTION INTRAVENOUS at 16:09

## 2020-03-31 RX ADMIN — POTASSIUM CHLORIDE 40 MEQ: 1.5 POWDER, FOR SOLUTION ORAL at 16:51

## 2020-03-31 RX ADMIN — ONDANSETRON 4 MG: 2 INJECTION INTRAMUSCULAR; INTRAVENOUS at 16:51

## 2020-03-31 RX ADMIN — ONDANSETRON 4 MG: 2 INJECTION INTRAMUSCULAR; INTRAVENOUS at 15:56

## 2020-03-31 ASSESSMENT — ENCOUNTER SYMPTOMS
ABDOMINAL PAIN: 1
HEMATURIA: 0
RHINORRHEA: 0
VOMITING: 0
DYSURIA: 0
SORE THROAT: 0
COUGH: 0
NAUSEA: 1
BLOOD IN STOOL: 0
FEVER: 0
DIARRHEA: 1

## 2020-03-31 ASSESSMENT — MIFFLIN-ST. JEOR: SCORE: 984.42

## 2020-03-31 NOTE — ED AVS SNAPSHOT
Emergency Department  [address] [place] [address]  Phone:  [phone]  Fax:  [phone]                                    Aliyah Gonzalez   MRN: [id number]    Department:   Emergency Department   Date of Visit:  3/31/2020           After Visit Summary Signature Page    I have received my discharge instructions, and my questions have been answered. I have discussed any challenges I see with this plan with the nurse or doctor.    ..........................................................................................................................................  Patient/Patient Representative Signature      ..........................................................................................................................................  Patient Representative Print Name and Relationship to Patient    ..................................................               ................................................  Date                                   Time    ..........................................................................................................................................  Reviewed by Signature/Title    ...................................................              ..............................................  Date                                               Time          22EPIC Rev 08/18

## 2020-03-31 NOTE — ED PROVIDER NOTES
"  History     Chief Complaint:  Abdominal Pain    The history is provided by the patient.      Aliyah Gonzalez is a 75 year old female with a history of  gastric outlet obstruction, peptic ulcer disease, celiac disease s/p several abdominal surgeries including appendectomy, cholecystectomy, and gastricbypass( as detailed below), who presents for evaluation of abdominal pain.  Patient states that starting 5 days ago she had an intense cramping sensation in the lower abdomen.  She states this lasted 8 hours and was excruciating in nature.  She states since 5 days ago, this is improved and is now at least manageable at a 5 out of 10.  She notes associated diarrhea on the first day, but states this is not been an ongoing issue although notes that she has not had a significant bowel movement.  She states that she has passed a few \"amparo\" 3 days ago.  She notes that she has had decreased oral intake secondary to nausea, and a sensation that the fluids and foods that she is consuming are not passing appropriately as if there is an \"obstruction of the lower end of my esophagus\".  She denies any similar symptoms in the past.  She did have an extensive abdominal surgery performed at [facility] in 2016, and states that she has not had significant issues since that time.  She is concerned given her surgical history, and persistent symptoms, prompting her evaluation.  She denies any fevers, vomiting, blood in her stools, recent travel, recent ill contact. She denies dysuria, hematuria, or URI type symptoms such as cough, sore throat or runny nose. One of her main concerns, is that she has had decreased oral intake secondary to nausea in the aforementioned esophageal sensation.    Allergies:  Ciprofloxacin  Codeine Sulfate  Propoxyphene  Sulfa Drugs    Medications:    Cholecalciferol  Lorazepam  Nexium  Ondansetron  Prozac   Restasis  Simethicone   Trazodone   Tramadol   Topamax     Past Medical History:    Anxiety  Arthritis " "  Bilateral dry eyes  Bipolar disorder  Celiac disease  Chondrocalcinosis  Depression  Essential tremor  Hernia ventral NOS  Hypertension  Insomnia  Keratoconjunctivitis sicca  Obstruction gastric outlet  Osteoarthritis  Osteoporosis   Peptic ulcer disease  Postoperative nausea and vomiting  Pseudogout of wrist  Vascular headache    Past Surgical History:    Appendectomy   section  Cholecystectomy  Colonoscopy  Combined esophagoscopy, gastroscopy, duodenoscopy  Gastric bypass  Gastrotomy tube placement  Hysterectomy  Incontinence surgery  Joint replacement  Knee arthroplasty  Salpingo-oophorectomy  Strabismus surgery, x2  Umbilical hernia repair  Vaginal prolapse repair    Family History:    Breast cancer  Colorectal cancer  DVT/PE post surgery  Lung cancer  Macular degeneration  Retinal disorder    Social History:  Marital Status:   [2]  PCP: Dr. Marcia Blackwell  Negative for tobacco use.  Negative for alcohol use.  Negative for drug use.     Review of Systems   Constitutional: Negative for fever.   HENT: Negative for rhinorrhea and sore throat.    Respiratory: Negative for cough.    Gastrointestinal: Positive for abdominal pain, diarrhea and nausea. Negative for blood in stool and vomiting.   Genitourinary: Negative for dysuria and hematuria.   All other systems reviewed and are negative.    Physical Exam     Patient Vitals for the past 24 hrs:   BP Temp Temp src Pulse Resp SpO2 Height Weight   20 1724 -- -- -- -- -- 99 % -- --   20 1654 -- -- -- -- -- 99 % -- --   20 1646 -- -- -- -- -- 99 % -- --   20 1635 -- -- -- -- -- 100 % -- --   20 1627 -- -- -- -- -- 99 % -- --   20 1602 -- -- -- -- -- 100 % -- --   20 1601 (!) 152/88 -- -- 63 -- 99 % -- --   20 1448 (!) 164/71 98.4  F (36.9  C) Oral 86 20 100 % 1.626 m (5' 4\") 49.4 kg (109 lb)       Physical Exam  General: Resting comfortably.  Alert and oriented.   Head:  The scalp, face, and head appear " normal   Eyes:  Conjunctivae and sclerae are normal    ENT:    The oropharynx is normal    Uvula is in the midline     Dry mucous membranes    White appearance to the tongue that does not scrape off with the tongue blade. No other oral lesions noted.   CV:  Regular rate and rhythm     Normal S1/S2    Peripheral pulses intact in all 4 extremities.   Resp:  Lungs are clear to auscultation    Non-labored    No rales or wheezing   GI:  Abdomen is soft, non-distended     Mild tenderness to the bilateral lower quadrants.     No rebound or guarding.     Normal bowel sounds   MS:  Normal muscular tone   Skin:  No rash or acute skin lesions noted   Neuro: Speech is normal and fluent.     Emergency Department Course     Imaging:  Radiology findings were communicated with the patient who voiced understanding of the findings.    CT abdomen pelvis w/ IV contrast:   1. Mild diffuse bowel wall thickening in the sigmoid and transverse   colon suggests a nonspecific colitis, most likely infectious or   inflammatory in etiology.   2. Sigmoid diverticulosis, without convincing evidence for   diverticulitis, as per radiology.     Laboratory:  Laboratory findings were communicated with the patient who voiced understanding of the findings.    CBC: WBC: 8.2, HGB: 13.1, PLT: 466(H)    CMP: Glucose 124(H), Potassium: 3.1(L), NA: 128(L), o/w WNL (Creatinine: 0.77)    Lipase: 367     UA with Microscopic: all WNL     Urine Culture: In process     Creatinine POCT: Creatinine: 0.8, GFR: 70, all WNL    Procedures:  None    Interventions:  1555 0.9% sodium chloride bolus, 500 ml, IV   1556 Zofran, 4 mg, IV  1556 Morphine, 2 mg, IV  1609 Iopamidol, 54 ml, IV  1640 Potassium chloride, 10 mEq, IV  1651 Potassium chloride, 40 mEq, PO  1651 Zofran, 4 mg, IV  1701 GI Cocktail (Maalox/Mylanta and viscous Lidocaine), 30 mL suspension, PO      Emergency Department Course:  Past medical records, nursing notes, and vitals reviewed.    1505 I performed an  exam of the patient as documented above.     IV was inserted and blood was drawn for laboratory testing, results above.  The patient provided a urine sample here in the emergency department. This was sent for laboratory testing, findings above.  The patient was sent for an abdomen pelvis CT while in the emergency department, results above.     1620 I rechecked the patient and discussed the results of her workup thus far. She had a bowel movement and is feeling better.  1641 Patient rechecked and updated.    1745 Patient rechecked and updated.     Findings and plan explained to the Patient. Patient discharged home with instructions regarding supportive care, medications, and reasons to return. The importance of close follow-up was reviewed. The patient was prescribed nystatin and zofran.    I personally reviewed the laboratory and imaging results with the Patient and answered all related questions prior to discharge.     Impression & Plan     Medical Decision Making:  Aliyah Gonzalez is a 75 year old female with a PMH as noted in the HPI who presents for evaluation of abdominal pain.  Please refer to the HPI for full details.  A broad differential was considered including but not limited to colitis, small bowel obstruction, mesenteric ischemia, gastroenteritis, celiac flare, peptic ulcer disease, gastritis, GERD, diverticulitis, AAA, dissection, among others.  Patient states that her abdominal pain has improved although not eliminated, but her main complaint is decreased oral intake secondary to some nausea and the aforementioned esophageal sensation.  Basic labs demonstrate mild hypokalemia, and hyponatremia.  When I discussed the findings with the patient, she does attest to having difficulty with low sodium in the past and states this is somewhat chronic problem for her.  UA is normal without evidence infection.  Lipase is within normal limits.  CT does demonstrate diffuse colitis to the sigmoid, transverse  colon.  There are no other abnormalities noted.  I did discuss these findings with the patient, and she expresses understanding.  Did discuss options going forward.  Initially, the patient was very insistent on being admitted to the hospital given her decreased oral intake.  I discussed I would be happy to admit her to the hospital, but discussed the admitting team would likely do similar treatments that were already done in the ER.  After further discussion, patient would like to trial at home therapy with Zofran, and setting a timer to drink fluids every 5 minutes and also increase her intake of food as tolerated.  No indication for admission to the hospital.  Vital signs are reassuring.  She overall appears well.  Repeat abdominal exam is benign.  I did discuss, however, she needs to follow-up closely with her primary care doctor for recheck of symptoms and blood work recheck. Discussed increased oral intake of potassium containing foods.  She will call the clinic and get an appointment in the next 1 to 2 days for recheck. Zofran was prescribed for home. She also requested a mouthwash for her thrush. She is asked return immediately for any worsening symptoms, blood in her stools, uncontrolled vomiting, worsening abdominal pain, or any other concerns.  All questions were answered prior to discharge.  The patient understands and agrees to this plan.      Diagnosis:    ICD-10-CM    1. Colitis  K52.9    2. Hyponatremia  E87.1    3. Decreased oral intake  R63.8    4. Hypokalemia  E87.6        Disposition:  Discharged to home.    Discharge Medications:  New Prescriptions    NYSTATIN (MYCOSTATIN) 977355 UNIT/ML SUSPENSION    Swish and swallow 5 mLs (500,000 Units) in mouth 4 times daily    ONDANSETRON (ZOFRAN) 4 MG TABLET    Take 1 tablet (4 mg) by mouth every 8 hours as needed for nausea       Scribe Disclosure:  Sameera ESTES, am serving as a scribe at 3:05 PM on 3/31/2020 to document services personally  performed by Lotus Toussaint PA based on my observations and the provider's statements to me.      EMERGENCY DEPARTMENT       Lotus Toussaint PA-C  03/31/20 [id number]

## 2020-03-31 NOTE — ED NOTES
Bed: ED26  Expected date: 3/31/20  Expected time:   Means of arrival:   Comments:  Triage abdominal pain

## 2020-04-01 ENCOUNTER — NURSE TRIAGE (OUTPATIENT)
Dept: NURSING | Facility: CLINIC | Age: 75
End: 2020-04-01

## 2020-04-01 ENCOUNTER — HOSPITAL ENCOUNTER (EMERGENCY)
Facility: CLINIC | Age: 75
Discharge: HOME | End: 2020-04-01
Attending: EMERGENCY MEDICINE | Admitting: EMERGENCY MEDICINE
Payer: MEDICARE

## 2020-04-01 VITALS
WEIGHT: 108.91 LBS | OXYGEN SATURATION: 99 % | DIASTOLIC BLOOD PRESSURE: 64 MMHG | HEART RATE: 60 BPM | RESPIRATION RATE: 18 BRPM | TEMPERATURE: 98.5 F | SYSTOLIC BLOOD PRESSURE: 131 MMHG | BODY MASS INDEX: 18.69 KG/M2

## 2020-04-01 DIAGNOSIS — K52.9 COLITIS: ICD-10-CM

## 2020-04-01 LAB
ALBUMIN SERPL-MCNC: 3.5 G/DL (ref 3.4–5)
ALP SERPL-CCNC: 89 U/L (ref 40–150)
ALT SERPL W P-5'-P-CCNC: 19 U/L (ref 0–50)
ANION GAP SERPL CALCULATED.3IONS-SCNC: 6 MMOL/L (ref 3–14)
AST SERPL W P-5'-P-CCNC: 13 U/L (ref 0–45)
BASOPHILS # BLD AUTO: 0 10E9/L (ref 0–0.2)
BASOPHILS NFR BLD AUTO: 0.4 %
BILIRUB SERPL-MCNC: 0.2 MG/DL (ref 0.2–1.3)
BUN SERPL-MCNC: 14 MG/DL (ref 7–30)
CALCIUM SERPL-MCNC: 8.8 MG/DL (ref 8.5–10.1)
CHLORIDE SERPL-SCNC: 105 MMOL/L (ref 94–109)
CO2 SERPL-SCNC: 21 MMOL/L (ref 20–32)
CREAT SERPL-MCNC: 0.86 MG/DL (ref 0.52–1.04)
DIFFERENTIAL METHOD BLD: ABNORMAL
EOSINOPHIL # BLD AUTO: 0.2 10E9/L (ref 0–0.7)
EOSINOPHIL NFR BLD AUTO: 1.8 %
ERYTHROCYTE [DISTWIDTH] IN BLOOD BY AUTOMATED COUNT: 14 % (ref 10–15)
GFR SERPL CREATININE-BSD FRML MDRD: 67 ML/MIN/{1.73_M2}
GLUCOSE SERPL-MCNC: 74 MG/DL (ref 70–99)
HCT VFR BLD AUTO: 35.6 % (ref 35–47)
HEMOCCULT STL QL: NEGATIVE
HGB BLD-MCNC: 12.1 G/DL (ref 11.7–15.7)
IMM GRANULOCYTES # BLD: 0 10E9/L (ref 0–0.4)
IMM GRANULOCYTES NFR BLD: 0.2 %
LACTATE BLD-SCNC: 1.4 MMOL/L (ref 0.7–2)
LIPASE SERPL-CCNC: 470 U/L (ref 73–393)
LYMPHOCYTES # BLD AUTO: 3.2 10E9/L (ref 0.8–5.3)
LYMPHOCYTES NFR BLD AUTO: 30.2 %
MCH RBC QN AUTO: 31.9 PG (ref 26.5–33)
MCHC RBC AUTO-ENTMCNC: 34 G/DL (ref 31.5–36.5)
MCV RBC AUTO: 94 FL (ref 78–100)
MONOCYTES # BLD AUTO: 0.9 10E9/L (ref 0–1.3)
MONOCYTES NFR BLD AUTO: 8.6 %
NEUTROPHILS # BLD AUTO: 6.3 10E9/L (ref 1.6–8.3)
NEUTROPHILS NFR BLD AUTO: 58.8 %
NRBC # BLD AUTO: 0 10*3/UL
NRBC BLD AUTO-RTO: 0 /100
PLATELET # BLD AUTO: 390 10E9/L (ref 150–450)
POTASSIUM SERPL-SCNC: 3.9 MMOL/L (ref 3.4–5.3)
PROT SERPL-MCNC: 6.9 G/DL (ref 6.8–8.8)
RBC # BLD AUTO: 3.79 10E12/L (ref 3.8–5.2)
SODIUM SERPL-SCNC: 132 MMOL/L (ref 133–144)
WBC # BLD AUTO: 10.6 10E9/L (ref 4–11)

## 2020-04-01 PROCEDURE — 96361 HYDRATE IV INFUSION ADD-ON: CPT

## 2020-04-01 PROCEDURE — 25000128 H RX IP 250 OP 636: Performed by: EMERGENCY MEDICINE

## 2020-04-01 PROCEDURE — 25800030 ZZH RX IP 258 OP 636: Performed by: EMERGENCY MEDICINE

## 2020-04-01 PROCEDURE — 99285 EMERGENCY DEPT VISIT HI MDM: CPT | Mod: 25

## 2020-04-01 PROCEDURE — 82272 OCCULT BLD FECES 1-3 TESTS: CPT | Performed by: EMERGENCY MEDICINE

## 2020-04-01 PROCEDURE — 85025 COMPLETE CBC W/AUTO DIFF WBC: CPT | Performed by: EMERGENCY MEDICINE

## 2020-04-01 PROCEDURE — 80053 COMPREHEN METABOLIC PANEL: CPT | Performed by: EMERGENCY MEDICINE

## 2020-04-01 PROCEDURE — 96374 THER/PROPH/DIAG INJ IV PUSH: CPT

## 2020-04-01 PROCEDURE — 83605 ASSAY OF LACTIC ACID: CPT | Performed by: EMERGENCY MEDICINE

## 2020-04-01 PROCEDURE — 83690 ASSAY OF LIPASE: CPT | Performed by: EMERGENCY MEDICINE

## 2020-04-01 PROCEDURE — 25000132 ZZH RX MED GY IP 250 OP 250 PS 637: Mod: GY | Performed by: EMERGENCY MEDICINE

## 2020-04-01 RX ORDER — POTASSIUM CHLORIDE 1.5 G/1.58G
20 POWDER, FOR SOLUTION ORAL ONCE
Status: COMPLETED | OUTPATIENT
Start: 2020-04-01 | End: 2020-04-01

## 2020-04-01 RX ORDER — HYDROMORPHONE HYDROCHLORIDE 1 MG/ML
0.5 INJECTION, SOLUTION INTRAMUSCULAR; INTRAVENOUS; SUBCUTANEOUS EVERY 30 MIN PRN
Status: DISCONTINUED | OUTPATIENT
Start: 2020-04-01 | End: 2020-04-01 | Stop reason: HOSPADM

## 2020-04-01 RX ADMIN — SODIUM CHLORIDE 1000 ML: 9 INJECTION, SOLUTION INTRAVENOUS at 11:16

## 2020-04-01 RX ADMIN — POTASSIUM CHLORIDE 20 MEQ: 1.5 POWDER, FOR SOLUTION ORAL at 11:45

## 2020-04-01 RX ADMIN — HYDROMORPHONE HYDROCHLORIDE 0.5 MG: 1 INJECTION, SOLUTION INTRAMUSCULAR; INTRAVENOUS; SUBCUTANEOUS at 11:18

## 2020-04-01 ASSESSMENT — ENCOUNTER SYMPTOMS
FEVER: 0
COUGH: 0
ABDOMINAL PAIN: 1
DYSURIA: 0
BLOOD IN STOOL: 1
VOMITING: 0

## 2020-04-01 NOTE — ED TRIAGE NOTES
"Pt . States abdominal pain remains after her work up here yesterday. Nauseated. States she gets a headache from nausea med. \"I don't like that\". Pt. States she a black formed stool yesterday while here.   "

## 2020-04-01 NOTE — ED PROVIDER NOTES
History     Chief Complaint:  Abdominal Pain    HPI   Aliyah Gonzalez is a 75 year old female with history of Celiac disease, peptic ulcer disease, and gastric outlet obstruction who presents with abdominal pain. The patient was seen in the Emergency Department yesterday for abdominal pain, CT results below. She was diagnosed with colitis, also prescribed Zofran for nausea and nystatin for oral thrush. During the visit, the patient was able to have a full, formed bowel movement for the first time in weeks. She returns today as she neglected to tell the provider that the stool was black and the toilet paper was tinged with blood. Here, the patient states that her diffuse lower abdominal pain has worsened, currently rated at a 5 out of 10. She has not taken any medications for this. She has not had a bowel movement today. The patient's vomiting, however, has resolved with Zofran use. She was able to have soup last night and applesauce this morning. Her oral thrush is also improving. The patient denies fever, cough, trouble breathing, chest pain, or urinary symptoms. She has not traveled or used any antibiotics recently.     CT abdomen pelvis w/ IV contrast 3/31/2020:   1. Mild diffuse bowel wall thickening in the sigmoid and transverse   colon suggests a nonspecific colitis, most likely infectious or   inflammatory in etiology.   2. Sigmoid diverticulosis, without convincing evidence for   diverticulitis, as per radiology.     Allergies:  Ciprofloxacin  Codeine Sulfate  Propoxyphene  Sulfa Drugs     Medications:    colchicine   Escitalopram   Diflucan  Flexeril  gabapentin  lisinopril   Lorazepam  Zofran  pantoprazole  propranolol  tramadol   trazodone  Venlafaxine     Past Medical History:    Anxiety  Arthritis   Bilateral dry eyes  Bipolar disorder  Celiac disease  Chondrocalcinosis  Depression  Essential tremor  Hernia ventral NOS  Hypertension  Insomnia  Keratoconjunctivitis sicca  Obstruction gastric  outlet  Osteoarthritis  Osteoporosis   Peptic ulcer disease  Postoperative nausea and vomiting  Pseudogout of wrist  Vascular headache     Past Surgical History:    Appendectomy   section  Cholecystectomy  Colonoscopy  Combined esophagoscopy, gastroscopy, duodenoscopy  Gastric bypass  Gastrotomy tube placement  Hysterectomy  Incontinence surgery  Joint replacement  Knee arthroplasty  Salpingo-oophorectomy  Strabismus surgery, x2  Umbilical hernia repair  Vaginal prolapse repair     Family History:    Breast cancer  Colorectal cancer  DVT/PE post surgery  Lung cancer  Macular degeneration  Retinal disorder    Social History:  Tobacco use: negative   Alcohol use: negative   PCP: Marcia Blackwell     Review of Systems   Constitutional: Negative for fever.   Respiratory: Negative for cough.    Cardiovascular: Negative for chest pain.   Gastrointestinal: Positive for abdominal pain and blood in stool. Negative for vomiting.   Genitourinary: Negative for dysuria.   All other systems reviewed and are negative.    Physical Exam     Patient Vitals for the past 24 hrs:   BP Temp Temp src Pulse Heart Rate Resp SpO2 Weight   20 1142 -- -- -- -- -- -- -- 49.4 kg (108 lb 14.5 oz)   20 1131 109/69 -- -- 61 -- 18 99 % --   20 1044 111/63 98.3  F (36.8  C) Oral -- 78 16 100 % --      Physical Exam  Nursing note and vitals reviewed.  Constitutional:  Appears well-developed and well-nourished.   HENT:   Head:    Atraumatic.   Mouth/Throat:   Oropharynx is clear and moist. No oropharyngeal exudate.   Eyes:    Pupils are equal, round, and reactive to light.   Neck:    Normal range of motion. Neck supple.      No tracheal deviation present. No thyromegaly present.   Cardiovascular:  Normal rate, regular rhythm, no murmur   Pulmonary/Chest: Breath sounds are clear and equal without wheezes or crackles.  Abdominal:   Soft. Bowel sounds are normal. Exhibits no distension and      no mass. Mild tenderness across  lower abdominal without rebound or guarding.   :                   Rectal exam:  Yellow/light brown stool without signs of blood.  Musculoskeletal:  Exhibits no edema.   Lymphadenopathy:  No cervical adenopathy.   Neurological:   Alert and oriented to person, place, and time.   Skin:    Skin is warm and dry. No rash noted. No pallor.     Emergency Department Course     Laboratory:  Laboratory findings were communicated with the patient who voiced understanding of the findings.    CBC: WBC 10.6, HGB 12.1,    CMP: Na 132 (L) o/w WNL (Creatinine 0.86)    1128 Lactic acid whole blood: 1.4  Lipase: 470 (H)    Occult blood stool: neg    Interventions:  1116 NS 1 L IV  1119 Dilaudid 0.5 mg IV  1145 Potassium chloride 20 mEq PO     Emergency Department Course:  Past medical records, nursing notes, and vitals reviewed.    1049 I performed an exam of the patient as documented above.     IV was inserted and blood was drawn for laboratory testing, results above.  A stool sample was obtained here in the emergency department. This was sent for laboratory testing, findings above.    1110 I consulted with Dr. Persaud, [facility], regarding the patient's history and presentation here in the emergency department.     1200 Patient rechecked and updated.      I personally reviewed the laboratory results with the Patient and answered all related questions prior to discharge.     Impression & Plan     Medical Decision Making:  Aliyah Gonzalez is a 75 year old female who presents to the emergency department today with a complaint of one episode of dark stool with possible blood in her stool.  This patient was diagnosed with colitis yesterday which is nonspecific colitis and is likely either ischemic or infectious.  There is no sign of bacterial infection she does not have an elevated white blood cell count, fever, or diarrhea.  If this is infectious, it is likely viral.  Unlikely inflammatory colitis.  The patient does not wish to be  admitted to the hospital currently and feels safe for discharge so I consulted Gastroenterology on call, Dr. Persaud.  He recommended to have the patient call his clinic and she could be scheduled for colonoscopy on Friday.  She was given his phone number.  She was IV hydrated with fluids and tolerating p.o. foods here and nontoxic-appearing so I felt she can be safely discharged home.  Her stool guaiac was negative and there was no sign of active bleeding her hemoglobin was stable.  She had been tolerating food and fluids and so she was told to push fluids, take Tylenol as needed for pain and use the Zofran as needed for nausea.  She has not been vomiting.  She was instructed on signs and symptoms to return for especially fever rectal bleeding vomiting or worsening pain.  Her lactic acid is normal without rectal bleeding so I did not feel that she required hospitalization since she has a benign abdominal exam also.  Her potassium was rechecked and was normal and sodium level was unremarkable.      Discharge Diagnosis:    ICD-10-CM    1. Colitis  K52.9 Occult blood stool     Disposition:  Discharged to home    Scribe Disclosure:  I, Nelly Francis, am serving as a scribe at 10:49 AM on 4/1/2020 to document services personally performed by Lara Mehta MD based on my observations and the provider's statements to me.       Lara Mehta MD  04/01/20 [id number]

## 2020-04-01 NOTE — TELEPHONE ENCOUNTER
I didn't report BM symptoms right. Had one while at the ER. They asked about it. It was formed. Didn't tell them it was more black than brownt really was black and bright red tinge on the toilet paper too. She will have someone bring her back to the ER for re-evaluation. She states she still has the abdominal pain.  Sagrario Walsh RN  [facility]      Reason for Disposition    Bloody, black, or tarry bowel movements    Additional Information    Negative: Passed out (i.e., fainted, collapsed and was not responding)    Negative: Shock suspected (e.g., cold/pale/clammy skin, too weak to stand, low BP, rapid pulse)    Negative: Vomiting red blood or black (coffee ground) material    Negative: Sounds like a life-threatening emergency to the triager    Negative: Diarrhea is the main symptom    Negative: Rectal symptoms    Negative: SEVERE rectal bleeding (large blood clots; on and off, or constant bleeding)    Negative: SEVERE dizziness (e.g., unable to stand, requires support to walk, feels like passing out now)    Negative: MODERATE rectal bleeding (small blood clots, passing blood without stool, or toilet water turns red) more than once a day    Protocols used: RECTAL BLEEDING-A-[place]

## 2023-04-15 DIAGNOSIS — F41.9 ANXIETY: ICD-10-CM

## 2023-04-15 DIAGNOSIS — G89.29 OTHER CHRONIC PAIN: Primary | ICD-10-CM

## 2023-04-15 RX ORDER — LORAZEPAM 0.5 MG/1
0.5 TABLET ORAL DAILY PRN
Qty: 3 TABLET | Refills: 0 | Status: SHIPPED | OUTPATIENT
Start: 2023-04-15 | End: 2023-04-17

## 2023-04-15 RX ORDER — PREGABALIN 100 MG/1
100 CAPSULE ORAL 3 TIMES DAILY
Qty: 9 CAPSULE | Refills: 0 | Status: SHIPPED | OUTPATIENT
Start: 2023-04-15 | End: 2023-04-20

## 2023-04-15 RX ORDER — CLONAZEPAM 1 MG/1
1 TABLET ORAL AT BEDTIME
Qty: 3 TABLET | Refills: 0 | Status: SHIPPED | OUTPATIENT
Start: 2023-04-15 | End: 2023-04-17

## 2023-04-15 NOTE — TELEPHONE ENCOUNTER
[facility] TELEPHONE ENCOUNTER    Chief Complaint   Patient presents with     Refill Request       Aliyah Gonzalez is a 78 year old  (1945),Nurse called today to report: new admit today with no scripts for any medications. Facility Registered Nurse attempted to call back hospital for the last several hours with no success. Finally the pharmacist from [facility] has even attempted to contact the discharging hospitalist as well for scripts with no success, therefore they are attempting to contact this on call service for needs.     ASSESSMENT/PLAN      OK for 3 days supply of Klonopin, lorazepam and lyrica as directed    Electronically signed by:   FRANK Ricketts CNP

## 2023-04-16 ENCOUNTER — LAB REQUISITION (OUTPATIENT)
Dept: LAB | Facility: CLINIC | Age: 78
End: 2023-04-16

## 2023-04-16 VITALS
TEMPERATURE: 98.1 F | RESPIRATION RATE: 16 BRPM | HEART RATE: 78 BPM | OXYGEN SATURATION: 93 % | BODY MASS INDEX: 21.3 KG/M2 | WEIGHT: 124.1 LBS | DIASTOLIC BLOOD PRESSURE: 60 MMHG | SYSTOLIC BLOOD PRESSURE: 112 MMHG

## 2023-04-16 DIAGNOSIS — Z11.1 ENCOUNTER FOR SCREENING FOR RESPIRATORY TUBERCULOSIS: ICD-10-CM

## 2023-04-16 RX ORDER — RIZATRIPTAN BENZOATE 10 MG/1
10 TABLET ORAL
COMMUNITY

## 2023-04-16 RX ORDER — FLUTICASONE PROPIONATE 50 MCG
2 SPRAY, SUSPENSION (ML) NASAL DAILY
COMMUNITY

## 2023-04-16 RX ORDER — OLANZAPINE 15 MG/1
15 TABLET ORAL AT BEDTIME
COMMUNITY
End: 2023-05-04

## 2023-04-16 RX ORDER — DICYCLOMINE HCL 20 MG
20 TABLET ORAL 2 TIMES DAILY
COMMUNITY
End: 2023-05-04

## 2023-04-16 RX ORDER — ACETAMINOPHEN 500 MG
1000 TABLET ORAL 3 TIMES DAILY PRN
COMMUNITY

## 2023-04-16 RX ORDER — PRIMIDONE 50 MG/1
50 TABLET ORAL 2 TIMES DAILY
COMMUNITY
End: 2023-05-04

## 2023-04-16 RX ORDER — POLYETHYLENE GLYCOL 3350 17 G/17G
17 POWDER, FOR SOLUTION ORAL DAILY
COMMUNITY

## 2023-04-16 RX ORDER — TOPIRAMATE 50 MG/1
50 TABLET, FILM COATED ORAL 2 TIMES DAILY
COMMUNITY
End: 2023-05-04

## 2023-04-16 RX ORDER — LORAZEPAM 0.5 MG/1
0.5 TABLET ORAL DAILY PRN
COMMUNITY
Start: 2023-04-15 | End: 2023-04-17

## 2023-04-16 RX ORDER — COLCHICINE 0.6 MG/1
TABLET ORAL
COMMUNITY
End: 2023-04-21

## 2023-04-16 RX ORDER — FLUOXETINE HYDROCHLORIDE 60 MG/1
60 TABLET, FILM COATED ORAL; ORAL DAILY
COMMUNITY

## 2023-04-17 ENCOUNTER — LAB REQUISITION (OUTPATIENT)
Dept: LAB | Facility: CLINIC | Age: 78
End: 2023-04-17

## 2023-04-17 ENCOUNTER — TRANSITIONAL CARE UNIT VISIT (OUTPATIENT)
Dept: GERIATRICS | Facility: CLINIC | Age: 78
End: 2023-04-17
Payer: COMMERCIAL

## 2023-04-17 DIAGNOSIS — R62.7 FAILURE TO THRIVE IN ADULT: ICD-10-CM

## 2023-04-17 DIAGNOSIS — K90.0 CELIAC DISEASE: ICD-10-CM

## 2023-04-17 DIAGNOSIS — I10 PRIMARY HYPERTENSION: ICD-10-CM

## 2023-04-17 DIAGNOSIS — F31.32 BIPOLAR AFFECTIVE DISORDER, CURRENTLY DEPRESSED, MODERATE (H): ICD-10-CM

## 2023-04-17 DIAGNOSIS — G43.719 INTRACTABLE CHRONIC MIGRAINE WITHOUT AURA AND WITHOUT STATUS MIGRAINOSUS: ICD-10-CM

## 2023-04-17 DIAGNOSIS — F41.1 GAD (GENERALIZED ANXIETY DISORDER): ICD-10-CM

## 2023-04-17 DIAGNOSIS — D72.825 BANDEMIA: ICD-10-CM

## 2023-04-17 DIAGNOSIS — K59.01 SLOW TRANSIT CONSTIPATION: ICD-10-CM

## 2023-04-17 DIAGNOSIS — J18.9 COMMUNITY ACQUIRED BILATERAL LOWER LOBE PNEUMONIA: Primary | ICD-10-CM

## 2023-04-17 DIAGNOSIS — I10 ESSENTIAL (PRIMARY) HYPERTENSION: ICD-10-CM

## 2023-04-17 PROBLEM — F31.9 BIPOLAR AFFECTIVE DISORDER, CURRENTLY ACTIVE (H): Status: ACTIVE | Noted: 2023-04-17

## 2023-04-17 PROCEDURE — 86481 TB AG RESPONSE T-CELL SUSP: CPT | Performed by: NURSE PRACTITIONER

## 2023-04-17 PROCEDURE — 99309 SBSQ NF CARE MODERATE MDM 30: CPT | Performed by: NURSE PRACTITIONER

## 2023-04-17 PROCEDURE — 36415 COLL VENOUS BLD VENIPUNCTURE: CPT | Performed by: NURSE PRACTITIONER

## 2023-04-17 PROCEDURE — P9604 ONE-WAY ALLOW PRORATED TRIP: HCPCS | Performed by: NURSE PRACTITIONER

## 2023-04-17 RX ORDER — LANOLIN ALCOHOL/MO/W.PET/CERES
6 CREAM (GRAM) TOPICAL AT BEDTIME
COMMUNITY

## 2023-04-17 NOTE — PROGRESS NOTES
[facility] GERIATRICS    PRIMARY CARE PROVIDER AND CLINIC:  Marcia Blackwell MD, [facility] [id number]  / [place] [id number]  Chief Complaint   Patient presents with     Hospital F/U      [facility] Medical Record Number:  [id number]  Place of Service where encounter took place:  [facility] (TCU) [[id number]]    Aliyah Gonzalez  is a 78 year old  (1945), admitted to the above facility from  [facility] . Hospital stay 4/1/23 through 4/15/23..   HPI:    This is a 77 yo female with PMHx for hypertension, celiac disease, generalized anxiety disorder, bipolarism, who was previously hospitalized from 2/16-2/22/23 per falls weakness and UTI, then presented again during this admission for CAP.  She had a cough, more shortness of breath and hypoglycemia.  Of note apparently has difficulty with failure to thrive issues.  Initially required high flow oxygen, did not tolerate BiPAP, CTA negative for PE, was found to have diffuse bilateral opacities, cefepime and Vanco initially given, then discontinued after 7-day course.  Oxygen needs significantly decreased and was discharged on room air.  Also found to have a right heel stage II pressure ulcer was given dressing orders.  No other changes noted, discharged to [facility] for rehab.    CODE STATUS/ADVANCE DIRECTIVES DISCUSSION:  Prior  DNR / DNI  ALLERGIES:   Allergies   Allergen Reactions     Atenolol      Ciprofloxacin      Codeine Sulfate      Propoxyphene      Sulfa Drugs      Venlafaxine       PAST MEDICAL HISTORY:   Past Medical History:   Diagnosis Date     Arthritis      Hypertension       PAST SURGICAL HISTORY:   has a past surgical history that includes GI surgery (left knee arthroplasty); appendectomy; GYN surgery (csection); ENT surgery (tmj); Laparoscopic herniorrhaphy hiatal; Esophagoscopy, gastroscopy, duodenoscopy (EGD), combined (11/23/2012); and Colonoscopy (11/23/2012).  FAMILY HISTORY: family history includes Cancer -  colorectal in her father.  SOCIAL HISTORY:   reports that she has never smoked. She has never used smokeless tobacco. She reports that she does not currently use drugs. She reports that she does not drink alcohol.  Patient's living condition: lives with spouse    Post Discharge Medication Reconciliation Status:   MED REC REQUIRED  Post Medication Reconciliation Status:  Discharge medications reconciled and changed, see notes/orders    Current Outpatient Medications   Medication Sig     acetaminophen (TYLENOL) 500 MG tablet Take 1,000 mg by mouth 3 times daily as needed for mild pain     cholecalciferol (VITAMIN D3) 125 mcg (5000 units) capsule Take 125 mcg by mouth daily     colchicine (COLCYRS) 0.6 MG tablet Give 0.6 mg by mouth one time a day for Pseudogout flare for 5 Days Okay to take with Prednisone     cycloSPORINE (RESTASIS) 0.05 % ophthalmic emulsion Place 1 drop into both eyes 2 times daily     dicyclomine (BENTYL) 20 MG tablet Take 20 mg by mouth 2 times daily     esomeprazole (NEXIUM) 20 MG DR capsule Take 20 mg by mouth every morning (before breakfast) Take 30-60 minutes before eating.     FLUoxetine HCl 60 MG TABS Take 60 mg by mouth daily     fluticasone (FLONASE) 50 MCG/ACT nasal spray Spray 2 sprays into both nostrils daily     melatonin 3 MG tablet Take 6 mg by mouth At Bedtime     OLANZapine (ZYPREXA) 15 MG tablet Take 15 mg by mouth At Bedtime     polyethylene glycol (MIRALAX) 17 g packet Take 17 g by mouth daily     pregabalin (LYRICA) 100 MG capsule Take 1 capsule (100 mg) by mouth 3 times daily (Patient taking differently: Take 50 mg by mouth 3 times daily)     primidone (MYSOLINE) 50 MG tablet Take 50 mg by mouth 2 times daily     rizatriptan (MAXALT) 10 MG tablet Take 10 mg by mouth at onset of headache for migraine     thiamine 50 MG TABS Take 50 mg by mouth daily     topiramate (TOPAMAX) 50 MG tablet Take 50 mg by mouth 2 times daily     Omega-3 Fatty Acids (OMEGA-3 FISH OIL PO) Take 1 g by  mouth daily (Patient not taking: Reported on 4/17/2023)     No current facility-administered medications for this visit.       ROS:  10 point ROS of systems including Constitutional, Eyes, Respiratory, Cardiovascular, Gastroenterology, Genitourinary, Integumentary, Musculoskeletal, Psychiatric were all negative except for pertinent positives noted in my HPI.    Vitals:  /60   Pulse 78   Temp 98.1  F (36.7  C)   Resp 16   Wt 56.3 kg (124 lb 1.6 oz)   SpO2 93%   BMI 21.30 kg/m    Exam:  GENERAL APPEARANCE:  Alert, in no distress, thin, cooperative, denies pain  ENT:  Mouth and posterior oropharynx normal, moist mucous membranes, normal hearing acuity  NECK:  No adenopathy,masses or thyromegaly  RESP:  diminished breath sounds bilaterally, crackles RLL, RA  CV:  Palpation and auscultation of heart done , regular rate and rhythm, no murmur, rub, or gallop, no edema  ABDOMEN:  normal bowel sounds, soft, nontender, no hepatosplenomegaly or other masses  M/S:   Able to move all extremities  SKIN:  Inspection of skin and subcutaneous tissue baseline  NEURO:   No focal deficits  PSYCH:  oriented to 2/3    Lab/Diagnostic data:  WBC 12.3  Hgb 9.7  MCV 93    Na 135  K 4.0  CR 0.84  GFR >60    ASSESSMENT/PLAN:    (J18.9) Community acquired bilateral lower lobe pneumonia    Initially required HFNC and given cefepime and Vanco, all discontinued prior to discharge, room air.  Encourage incentive spirometry    (I10) Primary hypertension  Not on medication, monitor blood pressure twice daily    (R62.7) Failure to thrive in adult  Encourage protein supplement 3 times daily, start weights 2 times weekly    Stage II pressure ulcer on heel, right  Consult [facility], see dressing change orders    Pain  Today decrease pregabalin to 50mg TID    Renal function  Last CR 0.84 with GFR >60, recheck BMP on 4/18    (D72.825) Bandemia  Last WBC 12.3, recheck CBC on 4/18    (K59.01) Slow transit constipation  Continue miralax  daily    (K90.0) Celiac disease  Continue gluten-free diet    (G43.719) Intractable chronic migraine without aura and without status migrainosus  (F41.1) LYN (generalized anxiety disorder)  (F31.32) Bipolar affective disorder, currently depressed, moderate (H)  Insomnia  Continue zypreza 15mg at HS, discontinue clonazepam and ativan per daughter request. Start melatonin 6mg at HS. Continue topamax 50mg BID    Orders:  Increase blood pressure monitoring, encourage IS, protein supplementation with 2 times weekly weights, WHI consult, discontinue Ativan and clonazepam, start melatonin, decrease pregabalin, BMP/CBC recheck    Electronically signed by:  Be Soni NP

## 2023-04-18 LAB
ANION GAP SERPL CALCULATED.3IONS-SCNC: 12 MMOL/L (ref 7–15)
BUN SERPL-MCNC: 42.1 MG/DL (ref 8–23)
CALCIUM SERPL-MCNC: 9.6 MG/DL (ref 8.8–10.2)
CHLORIDE SERPL-SCNC: 101 MMOL/L (ref 98–107)
CREAT SERPL-MCNC: 0.88 MG/DL (ref 0.51–0.95)
DEPRECATED HCO3 PLAS-SCNC: 23 MMOL/L (ref 22–29)
ERYTHROCYTE [DISTWIDTH] IN BLOOD BY AUTOMATED COUNT: 13 % (ref 10–15)
GAMMA INTERFERON BACKGROUND BLD IA-ACNC: 0.05 IU/ML
GFR SERPL CREATININE-BSD FRML MDRD: 68 ML/MIN/1.73M2
GLUCOSE SERPL-MCNC: 84 MG/DL (ref 70–99)
HCT VFR BLD AUTO: 32.2 % (ref 35–47)
HGB BLD-MCNC: 10.1 G/DL (ref 11.7–15.7)
M TB IFN-G BLD-IMP: NEGATIVE
M TB IFN-G CD4+ BCKGRND COR BLD-ACNC: 0.63 IU/ML
MCH RBC QN AUTO: 30.3 PG (ref 26.5–33)
MCHC RBC AUTO-ENTMCNC: 31.4 G/DL (ref 31.5–36.5)
MCV RBC AUTO: 97 FL (ref 78–100)
MITOGEN IGNF BCKGRD COR BLD-ACNC: 0 IU/ML
MITOGEN IGNF BCKGRD COR BLD-ACNC: 0 IU/ML
PLATELET # BLD AUTO: 553 10E3/UL (ref 150–450)
POTASSIUM SERPL-SCNC: 4.1 MMOL/L (ref 3.4–5.3)
QUANTIFERON MITOGEN: 0.68 IU/ML
QUANTIFERON NIL TUBE: 0.05 IU/ML
QUANTIFERON TB1 TUBE: 0.05 IU/ML
QUANTIFERON TB2 TUBE: 0.05
RBC # BLD AUTO: 3.33 10E6/UL (ref 3.8–5.2)
SODIUM SERPL-SCNC: 136 MMOL/L (ref 136–145)
WBC # BLD AUTO: 9.7 10E3/UL (ref 4–11)

## 2023-04-18 PROCEDURE — 85027 COMPLETE CBC AUTOMATED: CPT | Performed by: NURSE PRACTITIONER

## 2023-04-18 PROCEDURE — 36415 COLL VENOUS BLD VENIPUNCTURE: CPT | Performed by: NURSE PRACTITIONER

## 2023-04-18 PROCEDURE — P9604 ONE-WAY ALLOW PRORATED TRIP: HCPCS | Performed by: NURSE PRACTITIONER

## 2023-04-18 PROCEDURE — 80048 BASIC METABOLIC PNL TOTAL CA: CPT | Performed by: NURSE PRACTITIONER

## 2023-04-19 VITALS
TEMPERATURE: 97.4 F | HEIGHT: 63 IN | WEIGHT: 119.2 LBS | SYSTOLIC BLOOD PRESSURE: 103 MMHG | DIASTOLIC BLOOD PRESSURE: 66 MMHG | BODY MASS INDEX: 21.12 KG/M2 | RESPIRATION RATE: 18 BRPM | OXYGEN SATURATION: 99 % | HEART RATE: 66 BPM

## 2023-04-20 ENCOUNTER — TRANSITIONAL CARE UNIT VISIT (OUTPATIENT)
Dept: GERIATRICS | Facility: CLINIC | Age: 78
End: 2023-04-20
Payer: COMMERCIAL

## 2023-04-20 DIAGNOSIS — I10 PRIMARY HYPERTENSION: ICD-10-CM

## 2023-04-20 DIAGNOSIS — J18.9 COMMUNITY ACQUIRED BILATERAL LOWER LOBE PNEUMONIA: Primary | ICD-10-CM

## 2023-04-20 DIAGNOSIS — R62.7 FAILURE TO THRIVE IN ADULT: ICD-10-CM

## 2023-04-20 DIAGNOSIS — F31.32 BIPOLAR AFFECTIVE DISORDER, CURRENTLY DEPRESSED, MODERATE (H): ICD-10-CM

## 2023-04-20 DIAGNOSIS — K59.01 SLOW TRANSIT CONSTIPATION: ICD-10-CM

## 2023-04-20 PROCEDURE — 99309 SBSQ NF CARE MODERATE MDM 30: CPT | Performed by: NURSE PRACTITIONER

## 2023-04-20 RX ORDER — PREGABALIN 50 MG/1
50 CAPSULE ORAL 3 TIMES DAILY
COMMUNITY
End: 2023-05-02

## 2023-04-20 NOTE — PROGRESS NOTES
[place] [facility]  INITIAL VISIT NOTE  April 21, 2023    PRIMARY CARE PROVIDER AND CLINIC:  Marcia Blackwell [facility] [id number] [place]  / [facility] [id number]    CHIEF COMPLAINT:  Hospital follow-up/Initial visit    HPI:    Aliyah Gonzalez is a 78 year old  (1945) female who [facility] [place] [facility], [facility] for an initial visit.     Medical history is notable for hypertension, CKD, chronic anemia, rhinitis, UTI, essential tremor, pseudogout, cellulitis, migraine, tension headache, bipolar disorder, anxiety, osteoarthritis, osteoporosis, memory loss, gastric bypass surgery, and recent hospitalization in February 2023 for frequent falls and acute cystitis.    Summary of hospital course:  Patient was hospitalized at [facility] from April 1 through April 15, 2023 for sepsis due to bilateral pneumonia.  She originally presented with cough, weakness, and shortness of breath.  Screening for COVID-19, RSV, and influenza was negative.  Lactate was elevated at 2.5.  Procalcitonin was elevated at 2.7.  BNP was 368.  High-sensitivity troponin I was 0.04.  Chemistry profile was remarkable for creatinine of 1.22.  CBC showed white count of 10.4, hemoglobin of 10.7, MCV of 100, and platelet count of 280,000.  Chest x-ray was significant for bilateral lower lobe areas of infiltrate, left greater than right.  EKG showed normal sinus rhythm.  CT angio chest was negative for PE.  It demonstrated severe interstitial and airspace opacities throughout both lungs, suggestive of pulmonary edema versus ARDS versus pulmonary hemorrhage.  There was also small to moderate bilateral pleural effusions and mild enlargement of the pulmonary artery.  Echocardiogram revealed normal LV systolic function with EF of 60%, normal LV diastolic dysfunction, normal RV size and systolic function, no significant valvular disease, and no pulmonary hypertension.  Patient required BiPAP and treated  initially with ceftriaxone and dicyclomine.  Antibiotic therapy later broadened to cefepime and vancomycin due to increasing O2 needs.  She was aggressively treated with DuoNeb, saline nebs, and Mucinex.  Notably, tested for MRSA, Strep pneumoniae, and Legionella were all negative.  Patient's condition stabilized and ultimately improved.  TCU was recommended per therapies.  She completed the course of antibiotics inpatient.    Patient is admitted to this facility for medical management, nursing care, and rehab.     Of note, history was obtained from patient, facility RN, and extensive review of the chart.    Today's visit:  Patient was seen in her room, while sitting in a recliner.  She appears frail but comfortable.  She clearly has cognitive impairment.  She does endorse shortness of breath which is more subjective.  She has occasional dry cough.  She denies fever, chills, chest pain, palpitation, dyspnea, nausea, vomiting, abdominal pain, or urinary symptoms.  She had a bowel movement yesterday.      CODE STATUS:   DNR / DNI    PAST MEDICAL HISTORY:   Bilateral pneumonia with sepsis and acute hypoxic respiratory failure in April 2023  Hypertension  Ascending aortic aneurysm, measuring 4.2 cm, per CT chest on April 13, 2023  GERD  CKD stage II-IIIa, baseline creatinine around 1  Chronic anemia, baseline hemoglobin around 11  Rhinitis  UTI  Essential tremor  Pseudogout of the wrist  Celiac disease  Migraine  Tension headache  Bipolar 2 disorder  LYN  FTT  Right heel pressure ulcer, stage II  Osteoarthritis  Osteoporosis  History of gastric bypass surgery  Dementia    Past Medical History:   Diagnosis Date     Arthritis      Hypertension        PAST SURGICAL HISTORY:   Past Surgical History:   Procedure Laterality Date     APPENDECTOMY       COLONOSCOPY  11/23/2012    Procedure: COLONOSCOPY;;  Surgeon: Clarisse Callaway MD;  Location:  GI     ENT SURGERY  tmj     ESOPHAGOSCOPY, GASTROSCOPY, DUODENOSCOPY (EGD),  COMBINED  11/23/2012    Procedure: COMBINED ESOPHAGOSCOPY, GASTROSCOPY, DUODENOSCOPY (EGD), BIOPSY SINGLE OR MULTIPLE;  COMBINED ESOPHAGOSCOPY, GASTROSCOPY, DUODENOSCOPY (EGD) COLONOSCOPY (PROPOPHAL) (MAC);  Surgeon: Clarisse Callaway MD;  Location:  GI     GI SURGERY  left knee arthroplasty     GYN SURGERY  csection     LAPAROSCOPIC HERNIORRHAPHY HIATAL         FAMILY HISTORY:   Family History   Problem Relation Age of Onset     Cancer - colorectal Father        SOCIAL HISTORY:  Social History     Tobacco Use     Smoking status: Never     Smokeless tobacco: Never   Vaping Use     Vaping status: Not on file   Substance Use Topics     Alcohol use: No       MEDICATIONS:  Current Outpatient Medications   Medication Sig Dispense Refill     acetaminophen (TYLENOL) 500 MG tablet Take 1,000 mg by mouth 3 times daily as needed for mild pain       cholecalciferol (VITAMIN D3) 125 mcg (5000 units) capsule Take 125 mcg by mouth daily       colchicine (COLCYRS) 0.6 MG tablet Give 0.6 mg by mouth one time a day for Pseudogout flare for 5 Days Okay to take with Prednisone       cycloSPORINE (RESTASIS) 0.05 % ophthalmic emulsion Place 1 drop into both eyes 2 times daily       dicyclomine (BENTYL) 20 MG tablet Take 20 mg by mouth 2 times daily       esomeprazole (NEXIUM) 20 MG DR capsule Take 20 mg by mouth every morning (before breakfast) Take 30-60 minutes before eating.       FLUoxetine HCl 60 MG TABS Take 60 mg by mouth daily       fluticasone (FLONASE) 50 MCG/ACT nasal spray Spray 2 sprays into both nostrils daily       melatonin 3 MG tablet Take 6 mg by mouth At Bedtime       OLANZapine (ZYPREXA) 15 MG tablet Take 15 mg by mouth At Bedtime       Omega-3 Fatty Acids (OMEGA-3 FISH OIL PO) Take 1 g by mouth daily (Patient not taking: Reported on 4/17/2023)       polyethylene glycol (MIRALAX) 17 g packet Take 17 g by mouth daily       pregabalin (LYRICA) 100 MG capsule Take 1 capsule (100 mg) by mouth 3 times daily (Patient  "not taking: Reported on 4/20/2023) 9 capsule 0     primidone (MYSOLINE) 50 MG tablet Take 50 mg by mouth 2 times daily       rizatriptan (MAXALT) 10 MG tablet Take 10 mg by mouth at onset of headache for migraine       thiamine 50 MG TABS Take 50 mg by mouth daily       topiramate (TOPAMAX) 50 MG tablet Take 50 mg by mouth 2 times daily         MED REC REQUIRED  Post Medication Reconciliation Status: medication reconcilation previously completed during another office visit         ALLERGIES:  Allergies   Allergen Reactions     Atenolol      Ciprofloxacin      Codeine Sulfate      Propoxyphene      Sulfa Drugs      Venlafaxine        ROS:  10 point ROS was attempted but was limited, given patient's underlying cognitive impairment. It was reviewed as much as possible as outlined in HPI.    PHYSICAL EXAM:  Vital signs were reviewed in the chart.  Vital Signs: /74   Pulse 66   Temp 97.6  F (36.4  C)   Resp 18   Ht 1.6 m (5' 3\")   Wt 54.1 kg (119 lb 3.2 oz)   SpO2 97%   BMI 21.12 kg/m    General: Frail appearing but comfortable and in no acute distress  HEENT: Mild conjunctival pallor, no scleral icterus or injection, moist oral mucosa  Cardiovascular: Normal S1, S2, RRR  Respiratory: Lungs clear to auscultation bilaterally; no crackles, wheezing, or rhonchi  GI: Abdomen soft, non-tender, non-distended, +BS  Extremities: No LE edema  Neuro: CX II-XII grossly intact; ROM in all four extremities grossly intact  Psych: Alert and oriented x 2  Skin: Right heel wound is dressed    LABORATORY/IMAGING DATA:  All relevant labs and imaging data in [facility] and/or Care Everywhere were personally reviewed today.      Most Recent 3 CBC's:Recent Labs   Lab Test 04/18/23  0605 04/01/20  1103 03/31/20  1506   WBC 9.7 10.6 8.2   HGB 10.1* 12.1 13.1   MCV 97 94 94   * 390 466*     Most Recent 3 BMP's:Recent Labs   Lab Test 04/18/23  0605 04/01/20  1103 03/31/20  1506    132* 128*   POTASSIUM 4.1 3.9 3.1*   CHLORIDE " 101 105 99   CO2 23 21 20   BUN 42.1* 14 11   CR 0.88 0.86 0.77   ANIONGAP 12 6 9   GIA 9.6 8.8 8.9   GLC 84 74 124*     Most Recent 2 LFT's:Recent Labs   Lab Test 04/01/20  1103 03/31/20  1506   AST 13 12   ALT 19 20   ALKPHOS 89 95   BILITOTAL 0.2 0.2         ASSESSMENT/PLAN:  Bilateral pneumonia with sepsis,  Acute hypoxic respiratory failure, resolved.  Patient was treated with broad-spectrum antibiotics inpatient for healthcare associated pneumonia in view of recent hospitalization in February 2023 for frequent falls and acute cystitis.  She is currently stable and saturating at 97% on room air.  Plan:  Monitor respiratory status    History of hypertension.  Not on medical therapy.  Blood pressure is running low normal.  Plan:  Monitor blood pressure    Ascending aortic aneurysm.  Measuring 4.2 cm, per CT chest on April 13, 2023.  Plan:  Follow-up as outpatient    RENEE, resolved,  CKD stage II-IIIa.  Baseline creatinine around 1.  Last creatinine was stable at 0.88 on April 18.  Plan:  Avoid NSAIDs and nephrotoxins  Monitor renal function periodically    Acute on chronic anemia.  Baseline hemoglobin around 11.  Slight drop in hemoglobin in the setting of sepsis and phlebotomies.  Last hemoglobin was stable at 10.1 on April 18.  B12 level of 1099 on April 12, 2023.  Plan:  Monitor blood periodically    GERD.  Plan:  Continue esomeprazole 20 mg daily    Rhinitis.  Plan:  Continue fluticasone 50 mcg 2 sprays in each nostril daily    Essential tremor.  Plan:  Continue primidone 50 mg twice daily    Pseudogout of the wrist.  Resolved.  Plan:  Complete the course of colchicine 0.6 mg daily; last dose, today April 21    Celiac disease.  Plan:  Continue gluten-free diet    Migraine,  Tension headache.  Plan:  Continue topiramate 50 mg twice daily  Continue PRN rizatriptan for acute migraine     Bipolar 2 disorder,  LYN.  Stable.  Plan:  Continue fluoxetine 60 mg daily  Continue olanzapine 15 mg in the evening  Continue  pregabalin 50 mg 3 times daily    FTT,  History of gastric bypass surgery..  BMI 21.  Plan:  Continue dietary supplement    Right heel pressure ulcer, stage II.  Present on admission.  Plan:  Continue wound care per instructions    Memory loss.  Per chart review.  SLUMS score 11/30 this TCU stay consistent with dementia.  Plan:  Standard delirium precautions  Staff to assist with daily care and mobility    Physical deconditioning.  Plan:  Continue PT/OT evaluation and therapy        Orders written by provider at facility:  None.          Disclaimer: This note may contain text created using speech-recognition software and may contain unintended word substitutions.      Electronically signed by:  Sujit Spain MD

## 2023-04-20 NOTE — PROGRESS NOTES
"[facility] GERIATRICS    Chief Complaint   Patient presents with     RECHECK     HPI:  Aliyah Gonzalez is a 78 year old  (1945), who is being seen today for an episodic care visit at: [facility] (TCU) [[id number]].     This is a 79 yo female with PMHx for hypertension, celiac disease, generalized anxiety disorder, bipolarism, who was previously hospitalized from 2/16-2/22/23 per falls weakness and UTI, then presented again during this admission for CAP.  She had a cough, more shortness of breath and hypoglycemia.  Of note apparently has difficulty with failure to thrive issues.  Initially required high flow oxygen, did not tolerate BiPAP, CTA negative for PE, was found to have diffuse bilateral opacities, cefepime and Vanco initially given, then discontinued after 7-day course.  Oxygen needs significantly decreased and was discharged on room air.  Also found to have a right heel stage II pressure ulcer was given dressing orders.  No other changes noted, discharged to [facility] for rehab.    Today's concern is:   Dementia, therapy progress    Allergies, and PMH/PSH reviewed in [facility] today.  REVIEW OF SYSTEMS:  4 point ROS including Respiratory, CV, GI and , other than that noted in the HPI,  is negative    Objective:   /66   Pulse 66   Temp 97.4  F (36.3  C)   Resp 18   Ht 1.6 m (5' 3\")   Wt 54.1 kg (119 lb 3.2 oz)   SpO2 99%   BMI 21.12 kg/m    GENERAL APPEARANCE:  Alert, in no distress, thin, cooperative, denies pain  RESP:  diminished breath sounds bilaterally, RA  CV:  Palpation and auscultation of heart done , regular rate and rhythm, no murmur, rub, or gallop, no edema  PSYCH:  oriented to 2. SLUMS 11/30      Most Recent 3 CBC's:  Recent Labs   Lab Test 04/18/23  0605 04/01/20  1103 03/31/20  1506   WBC 9.7 10.6 8.2   HGB 10.1* 12.1 13.1   MCV 97 94 94   * 390 466*     Most Recent 3 BMP's:  Recent Labs   Lab Test 04/18/23  0605 04/01/20  1103 03/31/20  1506    132* 128* "   POTASSIUM 4.1 3.9 3.1*   CHLORIDE 101 105 99   CO2 23 21 20   BUN 42.1* 14 11   CR 0.88 0.86 0.77   ANIONGAP 12 6 9   GIA 9.6 8.8 8.9   GLC 84 74 124*       Assessment/Plan:    (J18.9) Community acquired bilateral lower lobe pneumonia    Initially required HFNC and given cefepime and Vanco, all discontinued prior to discharge, room air.  Encourage incentive spirometry. Resolving. F/u with pulmonary on 6/20 with CT prior     (I10) Primary hypertension  Not on medication, SBP      (R62.7) Failure to thrive in adult  Encourage protein supplement 3 times daily, weights 2 times weekly, 119lb (-4lbs). Consult dietitian per wt loss     Stage II pressure ulcer on heel, right  Consult [facility], see dressing change orders     Pain  Today holding pregabalin 50mg TID, denies pain     Renal function  Last CR 0.88 with GFR >60 on 4/18     (D72.825) Bandemia  Last WBC 9.7 on 4/18     (K59.01) Slow transit constipation  Continue miralax daily, adequate     (K90.0) Celiac disease  Continue gluten-free diet     (G43.719) Intractable chronic migraine without aura and without status migrainosus  (F41.1) LYN (generalized anxiety disorder)  (F31.32) Bipolar affective disorder, currently depressed, moderate (H)  Insomnia  Continue zypreza 15mg at HS, prior discontinued clonazepam and ativan per daughter request. Started melatonin 6mg at HS. Continue topamax 50mg BID. Sleeping adequately    Orders:  Hold lyrica, dietitian consult    Electronically signed by: Be Soni NP

## 2023-04-21 ENCOUNTER — TRANSITIONAL CARE UNIT VISIT (OUTPATIENT)
Dept: GERIATRICS | Facility: CLINIC | Age: 78
End: 2023-04-21
Payer: COMMERCIAL

## 2023-04-21 VITALS
OXYGEN SATURATION: 97 % | HEIGHT: 63 IN | BODY MASS INDEX: 21.12 KG/M2 | TEMPERATURE: 97.6 F | RESPIRATION RATE: 18 BRPM | WEIGHT: 119.2 LBS | SYSTOLIC BLOOD PRESSURE: 108 MMHG | HEART RATE: 66 BPM | DIASTOLIC BLOOD PRESSURE: 74 MMHG

## 2023-04-21 DIAGNOSIS — K90.0 CELIAC DISEASE: ICD-10-CM

## 2023-04-21 DIAGNOSIS — F31.81 BIPOLAR 2 DISORDER (H): ICD-10-CM

## 2023-04-21 DIAGNOSIS — R41.3 MEMORY LOSS: ICD-10-CM

## 2023-04-21 DIAGNOSIS — F41.1 GAD (GENERALIZED ANXIETY DISORDER): ICD-10-CM

## 2023-04-21 DIAGNOSIS — N18.2 STAGE 2 CHRONIC KIDNEY DISEASE: ICD-10-CM

## 2023-04-21 DIAGNOSIS — I71.21 ANEURYSM OF ASCENDING AORTA WITHOUT RUPTURE (H): ICD-10-CM

## 2023-04-21 DIAGNOSIS — L89.612 PRESSURE INJURY OF RIGHT HEEL, STAGE 2 (H): ICD-10-CM

## 2023-04-21 DIAGNOSIS — K21.9 GASTROESOPHAGEAL REFLUX DISEASE, UNSPECIFIED WHETHER ESOPHAGITIS PRESENT: ICD-10-CM

## 2023-04-21 DIAGNOSIS — G43.909 MIGRAINE WITHOUT STATUS MIGRAINOSUS, NOT INTRACTABLE, UNSPECIFIED MIGRAINE TYPE: ICD-10-CM

## 2023-04-21 DIAGNOSIS — M11.20 PSEUDOGOUT: ICD-10-CM

## 2023-04-21 DIAGNOSIS — Z98.84 H/O GASTRIC BYPASS: ICD-10-CM

## 2023-04-21 DIAGNOSIS — R62.7 ADULT FAILURE TO THRIVE: ICD-10-CM

## 2023-04-21 DIAGNOSIS — D64.9 ACUTE ON CHRONIC ANEMIA: ICD-10-CM

## 2023-04-21 DIAGNOSIS — J18.9 HEALTHCARE-ASSOCIATED PNEUMONIA: Primary | ICD-10-CM

## 2023-04-21 DIAGNOSIS — R53.81 PHYSICAL DECONDITIONING: ICD-10-CM

## 2023-04-21 PROCEDURE — 99306 1ST NF CARE HIGH MDM 50: CPT | Performed by: INTERNAL MEDICINE

## 2023-04-21 NOTE — LETTER
4/21/2023        RE: Aliyah Gonzalez  [address]  [facility] [address]        [facility]  INITIAL VISIT NOTE  April 21, 2023    PRIMARY CARE PROVIDER AND CLINIC:  Balkissoon, Geeta L [facility] [address] [facility] [place] [address]    CHIEF COMPLAINT:  Hospital follow-up/Initial visit    HPI:    Aliyah Gonzalez is a 78 year old  (1945) female who was seen at [facility] on April 21, 2023 for an initial visit.     Medical history is notable for hypertension, CKD, chronic anemia, rhinitis, UTI, essential tremor, pseudogout, cellulitis, migraine, tension headache, bipolar disorder, anxiety, osteoarthritis, osteoporosis, memory loss, gastric bypass surgery, and recent hospitalization in February 2023 for frequent falls and acute cystitis.    Summary of hospital course:  Patient was hospitalized at [facility] from April 1 through April 15, 2023 for sepsis due to bilateral pneumonia.  She originally presented with cough, weakness, and shortness of breath.  Screening for COVID-19, RSV, and influenza was negative.  Lactate was elevated at 2.5.  Procalcitonin was elevated at 2.7.  BNP was 368.  High-sensitivity troponin I was 0.04.  Chemistry profile was remarkable for creatinine of 1.22.  CBC showed white count of 10.4, hemoglobin of 10.7, MCV of 100, and platelet count of 280,000.  Chest x-ray was significant for bilateral lower lobe areas of infiltrate, left greater than right.  EKG showed normal sinus rhythm.  CT angio chest was negative for PE.  It demonstrated severe interstitial and airspace opacities throughout both lungs, suggestive of pulmonary edema versus ARDS versus pulmonary hemorrhage.  There was also small to moderate bilateral pleural effusions and mild enlargement of the pulmonary artery.  Echocardiogram revealed normal LV systolic function with EF of 60%, normal LV diastolic dysfunction, normal RV size and systolic function, no significant  valvular disease, and no pulmonary hypertension.  Patient required BiPAP and treated initially with ceftriaxone and dicyclomine.  Antibiotic therapy later broadened to cefepime and vancomycin due to increasing O2 needs.  She was aggressively treated with DuoNeb, saline nebs, and Mucinex.  Notably, tested for MRSA, Strep pneumoniae, and Legionella were all negative.  Patient's condition stabilized and ultimately improved.  TCU was recommended per therapies.  She completed the course of antibiotics inpatient.    Patient is admitted to this facility for medical management, nursing care, and rehab.     Of note, history was obtained from patient, facility RN, and extensive review of the chart.    Today's visit:  Patient was seen in her room, while sitting in a recliner.  She appears frail but comfortable.  She clearly has cognitive impairment.  She does endorse shortness of breath which is more subjective.  She has occasional dry cough.  She denies fever, chills, chest pain, palpitation, dyspnea, nausea, vomiting, abdominal pain, or urinary symptoms.  She had a bowel movement yesterday.      CODE STATUS:   DNR / DNI    PAST MEDICAL HISTORY:   Bilateral pneumonia with sepsis and acute hypoxic respiratory failure in April 2023  Hypertension  Ascending aortic aneurysm, measuring 4.2 cm, per CT chest on April 13, 2023  GERD  CKD stage II-IIIa, baseline creatinine around 1  Chronic anemia, baseline hemoglobin around 11  Rhinitis  UTI  Essential tremor  Pseudogout of the wrist  Celiac disease  Migraine  Tension headache  Bipolar 2 disorder  LYN  FTT  Right heel pressure ulcer, stage II  Osteoarthritis  Osteoporosis  History of gastric bypass surgery  Dementia    Past Medical History:   Diagnosis Date     Arthritis      Hypertension        PAST SURGICAL HISTORY:   Past Surgical History:   Procedure Laterality Date     APPENDECTOMY       COLONOSCOPY  11/23/2012    Procedure: COLONOSCOPY;;  Surgeon: Clarisse Callaway MD;   Location:  GI     ENT SURGERY  tmj     ESOPHAGOSCOPY, GASTROSCOPY, DUODENOSCOPY (EGD), COMBINED  11/23/2012    Procedure: COMBINED ESOPHAGOSCOPY, GASTROSCOPY, DUODENOSCOPY (EGD), BIOPSY SINGLE OR MULTIPLE;  COMBINED ESOPHAGOSCOPY, GASTROSCOPY, DUODENOSCOPY (EGD) COLONOSCOPY (PROPOPHAL) (MAC);  Surgeon: Clarisse Callaway MD;  Location:  GI     GI SURGERY  left knee arthroplasty     GYN SURGERY  csection     LAPAROSCOPIC HERNIORRHAPHY HIATAL         FAMILY HISTORY:   Family History   Problem Relation Age of Onset     Cancer - colorectal Father        SOCIAL HISTORY:  Social History     Tobacco Use     Smoking status: Never     Smokeless tobacco: Never   Vaping Use     Vaping status: Not on file   Substance Use Topics     Alcohol use: No       MEDICATIONS:  Current Outpatient Medications   Medication Sig Dispense Refill     acetaminophen (TYLENOL) 500 MG tablet Take 1,000 mg by mouth 3 times daily as needed for mild pain       cholecalciferol (VITAMIN D3) 125 mcg (5000 units) capsule Take 125 mcg by mouth daily       colchicine (COLCYRS) 0.6 MG tablet Give 0.6 mg by mouth one time a day for Pseudogout flare for 5 Days Okay to take with Prednisone       cycloSPORINE (RESTASIS) 0.05 % ophthalmic emulsion Place 1 drop into both eyes 2 times daily       dicyclomine (BENTYL) 20 MG tablet Take 20 mg by mouth 2 times daily       esomeprazole (NEXIUM) 20 MG DR capsule Take 20 mg by mouth every morning (before breakfast) Take 30-60 minutes before eating.       FLUoxetine HCl 60 MG TABS Take 60 mg by mouth daily       fluticasone (FLONASE) 50 MCG/ACT nasal spray Spray 2 sprays into both nostrils daily       melatonin 3 MG tablet Take 6 mg by mouth At Bedtime       OLANZapine (ZYPREXA) 15 MG tablet Take 15 mg by mouth At Bedtime       Omega-3 Fatty Acids (OMEGA-3 FISH OIL PO) Take 1 g by mouth daily (Patient not taking: Reported on 4/17/2023)       polyethylene glycol (MIRALAX) 17 g packet Take 17 g by mouth daily        "pregabalin (LYRICA) 100 MG capsule Take 1 capsule (100 mg) by mouth 3 times daily (Patient not taking: Reported on 4/20/2023) 9 capsule 0     primidone (MYSOLINE) 50 MG tablet Take 50 mg by mouth 2 times daily       rizatriptan (MAXALT) 10 MG tablet Take 10 mg by mouth at onset of headache for migraine       thiamine 50 MG TABS Take 50 mg by mouth daily       topiramate (TOPAMAX) 50 MG tablet Take 50 mg by mouth 2 times daily         MED REC REQUIRED  Post Medication Reconciliation Status: medication reconcilation previously completed during another office visit         ALLERGIES:  Allergies   Allergen Reactions     Atenolol      Ciprofloxacin      Codeine Sulfate      Propoxyphene      Sulfa Drugs      Venlafaxine        ROS:  10 point ROS were negative other than the symptoms noted above in the HPI.    PHYSICAL EXAM:  Vital signs were reviewed in the chart.  Vital Signs: /74   Pulse 66   Temp 97.6  F (36.4  C)   Resp 18   Ht 1.6 m (5' 3\")   Wt 54.1 kg (119 lb 3.2 oz)   SpO2 97%   BMI 21.12 kg/m    General: Frail appearing but comfortable and in no acute distress  HEENT: Mild conjunctival pallor, no scleral icterus or injection, moist oral mucosa  Cardiovascular: Normal S1, S2, RRR  Respiratory: Lungs clear to auscultation bilaterally  GI: Abdomen soft, non-tender, non-distended, +BS  Extremities: No LE edema  Neuro: CX II-XII grossly intact; ROM in all four extremities grossly intact  Psych: Alert and oriented x 2  Skin: Right heel wound is dressed    LABORATORY/IMAGING DATA:  All relevant labs and imaging data in [facility] and/or Care Everywhere were personally reviewed today.      Most Recent 3 CBC's:Recent Labs   Lab Test 04/18/23  0605 04/01/20  1103 03/31/20  1506   WBC 9.7 10.6 8.2   HGB 10.1* 12.1 13.1   MCV 97 94 94   * 390 466*     Most Recent 3 BMP's:Recent Labs   Lab Test 04/18/23  0605 04/01/20  1103 03/31/20  1506    132* 128*   POTASSIUM 4.1 3.9 3.1*   CHLORIDE 101 105 99   CO2 " 23 21 20   BUN 42.1* 14 11   CR 0.88 0.86 0.77   ANIONGAP 12 6 9   GIA 9.6 8.8 8.9   GLC 84 74 124*     Most Recent 2 LFT's:Recent Labs   Lab Test 04/01/20  1103 03/31/20  1506   AST 13 12   ALT 19 20   ALKPHOS 89 95   BILITOTAL 0.2 0.2         ASSESSMENT/PLAN:  Bilateral pneumonia with sepsis,  Acute hypoxic respiratory failure, resolved.  Patient was treated with broad-spectrum antibiotics inpatient for healthcare associated pneumonia in view of recent hospitalization in February 2023 for frequent falls and acute cystitis.  She is currently stable and saturating 97% on room air.  Plan:  Monitor respiratory status    History of hypertension.  Not on medical therapy.  Blood pressure is running low normal.  Plan:  Monitor blood pressure    Ascending aortic aneurysm.  Measuring 4.2 cm, per CT chest on April 13, 2023.  Plan:  Follow-up as outpatient    RENEE, resolved,  CKD stage II-IIIa.  Baseline creatinine around 1.  Last creatinine was stable at 0.88 on April 18.  Plan:  Avoid NSAIDs and nephrotoxins  Monitor renal function periodically    Acute on chronic anemia.  Baseline hemoglobin around 11.  Slight drop in hemoglobin in the setting of sepsis and phlebotomies.  Last hemoglobin was stable at 10.1 on April 18.  B12 level of 1099 on April 12, 2023.  Plan:  Monitor blood periodically    GERD.  Plan:  Continue esomeprazole 20 mg daily    Rhinitis.  Plan:  Continue fluticasone 50 mcg 2 sprays in each nostril daily    Essential tremor.  Plan:  Continue primidone 50 mg twice daily    Pseudogout of the wrist.  Resolved.  Plan:  Complete the course of colchicine 0.6 mg daily; last dose, today April 21    Celiac disease.  Plan:  Continue gluten-free diet    Migraine,  Tension headache.  Plan:  Continue topiramate 50 mg twice daily  Continue PRN rizatriptan for acute migraine     Bipolar 2 disorder,  LYN.  Stable.  Plan:  Continue fluoxetine 60 mg daily  Continue olanzapine 15 mg in the evening  Continue pregabalin 50 mg 3  times daily    FTT,  History of gastric bypass surgery..  BMI 21.  Plan:  Continue dietary supplement    Right heel pressure ulcer, stage II.  Present on admission.  Plan:  Continue wound care per instructions    Memory loss.  Per chart review.  SLUMS score 11/30 this TCU stay consistent with dementia.  Plan:  Standard delirium precautions  Staff to assist with daily care and mobility    Physical deconditioning.  Plan:  Continue PT/OT evaluation and therapy        Orders written by provider at facility:  None.          Disclaimer: This note may contain text created using speech-recognition software and may contain unintended word substitutions.      Electronically signed by:  Sujit Spain MD                          Sincerely,        Sujit Spain MD

## 2023-04-24 VITALS
HEART RATE: 60 BPM | HEIGHT: 63 IN | WEIGHT: 120.8 LBS | DIASTOLIC BLOOD PRESSURE: 60 MMHG | OXYGEN SATURATION: 94 % | SYSTOLIC BLOOD PRESSURE: 118 MMHG | RESPIRATION RATE: 18 BRPM | TEMPERATURE: 97.6 F | BODY MASS INDEX: 21.4 KG/M2

## 2023-04-25 ENCOUNTER — TRANSITIONAL CARE UNIT VISIT (OUTPATIENT)
Dept: GERIATRICS | Facility: CLINIC | Age: 78
End: 2023-04-25
Payer: COMMERCIAL

## 2023-04-25 DIAGNOSIS — F31.32 BIPOLAR AFFECTIVE DISORDER, CURRENTLY DEPRESSED, MODERATE (H): ICD-10-CM

## 2023-04-25 DIAGNOSIS — K90.0 CELIAC DISEASE: ICD-10-CM

## 2023-04-25 DIAGNOSIS — R62.7 FAILURE TO THRIVE IN ADULT: ICD-10-CM

## 2023-04-25 DIAGNOSIS — I10 PRIMARY HYPERTENSION: ICD-10-CM

## 2023-04-25 DIAGNOSIS — K59.01 SLOW TRANSIT CONSTIPATION: Primary | ICD-10-CM

## 2023-04-25 PROCEDURE — 99309 SBSQ NF CARE MODERATE MDM 30: CPT | Performed by: NURSE PRACTITIONER

## 2023-04-25 NOTE — PROGRESS NOTES
"[facility]IATR[facility]    Chief Complaint   Patient presents with     RECHECK     HPI:  Aliyah Gonzalez is a 78 year old  (1945), who is being seen today for an episodic care visit at: [facility] (U) [[id number]].     This is a 79 yo female with PMHx for hypertension, celiac disease, generalized anxiety disorder, bipolarism, who was previously hospitalized from 2/16-2/22/23 per falls weakness and UTI, then presented again during this admission for CAP.  She had a cough, more shortness of breath and hypoglycemia.  Of note apparently has difficulty with failure to thrive issues.  Initially required high flow oxygen, did not tolerate BiPAP, CTA negative for PE, was found to have diffuse bilateral opacities, cefepime and Vanco initially given, then discontinued after 7-day course.  Oxygen needs significantly decreased and was discharged on room air.  Also found to have a right heel stage II pressure ulcer was given dressing orders.  No other changes noted, discharged to [facility] for rehab.    Today's concern is:  Therapy progress    Allergies, and PMH/PSH reviewed in [facility] today.  REVIEW OF SYSTEMS:  4 point ROS including Respiratory, CV, GI and , other than that noted in the HPI,  is negative    Objective:   /60   Pulse 60   Temp 97.6  F (36.4  C)   Resp 18   Ht 1.6 m (5' 3\")   Wt 54.8 kg (120 lb 12.8 oz)   SpO2 94%   BMI 21.40 kg/m    GENERAL APPEARANCE:  Alert, in no distress, thin, cooperative, denies pain  RESP:  CTA, RA  CV:  Palpation and auscultation of heart done , regular rate and rhythm, no murmur, rub, or gallop, no edema  PSYCH:  oriented to 2. SLUMS 11/30      Most Recent 3 CBC's:  Recent Labs   Lab Test 04/18/23  0605 04/01/20  1103 03/31/20  1506   WBC 9.7 10.6 8.2   HGB 10.1* 12.1 13.1   MCV 97 94 94   * 390 466*     Most Recent 3 BMP's:  Recent Labs   Lab Test 04/18/23  0605 04/01/20  1103 03/31/20  1506    132* 128*   POTASSIUM 4.1 3.9 3.1*   CHLORIDE 101 " 105 99   CO2 23 21 20   BUN 42.1* 14 11   CR 0.88 0.86 0.77   ANIONGAP 12 6 9   GIA 9.6 8.8 8.9   GLC 84 74 124*       Assessment/Plan:    (J18.9) Community acquired bilateral lower lobe pneumonia    Initially required HFNC and given cefepime and Vanco, all discontinued prior to discharge, room air.  Encourage incentive spirometry. Resolving. F/u with pulmonary on 6/20 with CT prior. No change     (I10) Primary hypertension  Not on medication, SBP , stable     (R62.7) Failure to thrive in adult  Encourage protein supplement 3 times daily, weights 2 times weekly, 120lb (-3lbs). Dietitian following     Stage II pressure ulcer on heel, right  Consult [facility], see dressing change orders. Stable     Pain  Holding pregabalin 50mg TID, denies pain     Renal function  Last CR 0.88 with GFR >60 on 4/18     (D72.825) Bandemia  Last WBC 9.7 on 4/18     (K59.01) Slow transit constipation  Continue miralax daily, adequate     (K90.0) Celiac disease  Continue gluten-free diet, no issues     (G43.719) Intractable chronic migraine without aura and without status migrainosus  (F41.1) LYN (generalized anxiety disorder)  (F31.32) Bipolar affective disorder, currently depressed, moderate (H)  Insomnia  Continue zypreza 15mg at HS, prior discontinued clonazepam and ativan per daughter request. Using melatonin 6mg at HS. Continue topamax 50mg BID. Sleeping adequately    Therapy  Ambulating 170ft with 4WW, SBA    Orders:  NA    Electronically signed by: Be Soni NP

## 2023-04-27 VITALS
OXYGEN SATURATION: 97 % | WEIGHT: 122.6 LBS | RESPIRATION RATE: 18 BRPM | SYSTOLIC BLOOD PRESSURE: 123 MMHG | DIASTOLIC BLOOD PRESSURE: 85 MMHG | HEIGHT: 63 IN | BODY MASS INDEX: 21.72 KG/M2 | HEART RATE: 61 BPM | TEMPERATURE: 98.5 F

## 2023-04-28 ENCOUNTER — TRANSITIONAL CARE UNIT VISIT (OUTPATIENT)
Dept: GERIATRICS | Facility: CLINIC | Age: 78
End: 2023-04-28
Payer: COMMERCIAL

## 2023-04-28 DIAGNOSIS — I10 PRIMARY HYPERTENSION: ICD-10-CM

## 2023-04-28 DIAGNOSIS — K59.01 SLOW TRANSIT CONSTIPATION: ICD-10-CM

## 2023-04-28 DIAGNOSIS — R62.7 FAILURE TO THRIVE IN ADULT: ICD-10-CM

## 2023-04-28 DIAGNOSIS — F31.32 BIPOLAR AFFECTIVE DISORDER, CURRENTLY DEPRESSED, MODERATE (H): ICD-10-CM

## 2023-04-28 DIAGNOSIS — J18.9 COMMUNITY ACQUIRED BILATERAL LOWER LOBE PNEUMONIA: Primary | ICD-10-CM

## 2023-04-28 PROCEDURE — 99309 SBSQ NF CARE MODERATE MDM 30: CPT | Performed by: NURSE PRACTITIONER

## 2023-04-28 NOTE — PROGRESS NOTES
"[facility]    Chief Complaint   Patient presents with     RECHECK     HPI:  Aliyah Gonzalez is a 78 year old  (1945), who is being seen today for an episodic care visit at: [facility] (TCU) [[id number]].     This is a 77 yo female with PMHx for hypertension, celiac disease, generalized anxiety disorder, bipolarism, who was previously hospitalized from 2/16-2/22/23 per falls weakness and UTI, then presented again during this admission for CAP.  She had a cough, more shortness of breath and hypoglycemia.  Of note apparently has difficulty with failure to thrive issues.  Initially required high flow oxygen, did not tolerate BiPAP, CTA negative for PE, was found to have diffuse bilateral opacities, cefepime and Vanco initially given, then discontinued after 7-day course.  Oxygen needs significantly decreased and was discharged on room air.  Also found to have a right heel stage II pressure ulcer was given dressing orders.  No other changes noted, discharged to [facility] for rehab.    Today's concern is:     Allergies, and PMH/PSH reviewed in [facility] today.  REVIEW OF SYSTEMS:  4 point ROS including Respiratory, CV, GI and , other than that noted in the HPI,  is negative    Objective:   /85   Pulse 61   Temp 98.5  F (36.9  C)   Resp 18   Ht 1.6 m (5' 3\")   Wt 55.6 kg (122 lb 9.6 oz)   SpO2 97%   BMI 21.72 kg/m    GENERAL APPEARANCE:  Alert, in no distress, thin, cooperative, denies pain  RESP:  CTA, RA  CV:  Palpation and auscultation of heart done , regular rate and rhythm, no murmur, rub, or gallop, no edema  PSYCH:  oriented to 2. SLUMS 11/30      Most Recent 3 CBC's:  Recent Labs   Lab Test 04/18/23  0605 04/01/20  1103 03/31/20  1506   WBC 9.7 10.6 8.2   HGB 10.1* 12.1 13.1   MCV 97 94 94   * 390 466*     Most Recent 3 BMP's:  Recent Labs   Lab Test 04/18/23  0605 04/01/20  1103 03/31/20  1506    132* 128*   POTASSIUM 4.1 3.9 3.1*   CHLORIDE 101 105 99   CO2 23 21 " 20   BUN 42.1* 14 11   CR 0.88 0.86 0.77   ANIONGAP 12 6 9   GIA 9.6 8.8 8.9   GLC 84 74 124*       Assessment/Plan:    (J18.9) Community acquired bilateral lower lobe pneumonia    Initially required HFNC and given cefepime and Vanco, all discontinued prior to discharge, room air.  Encourage incentive spirometry. Resolved. F/u with pulmonary on 6/20 with CT prior. No change     (I10) Primary hypertension  Not on medication, -120, stable     (R62.7) Failure to thrive in adult  Encourage protein supplement 3 times daily, weights 2 times weekly, 122lb (stable). Dietitian following     Stage II pressure ulcer on heel, right  Stabbed over, intact     Pain  Holding pregabalin 50mg TID, denies pain. No need to restart     Renal function  Last CR 0.88 with GFR >60 on 4/18     (D72.825) Bandemia  Last WBC 9.7 on 4/18     (K59.01) Slow transit constipation  Continue miralax daily, no issue     (K90.0) Celiac disease  Continue gluten-free diet, no issues     (G43.719) Intractable chronic migraine without aura and without status migrainosus  (F41.1) LYN (generalized anxiety disorder)  (F31.32) Bipolar affective disorder, currently depressed, moderate (H)  Insomnia  Continue zypreza 15mg at HS, prior discontinued clonazepam and ativan per daughter request. Using melatonin 6mg at HS. Continue topamax 50mg BID. Sleeping adequately. No behaviors     Therapy  Ambulating up to 170ft with 4WW, SBA. DEZ 46/56    Orders:  NA    Electronically signed by: Be Soni NP

## 2023-05-01 VITALS
WEIGHT: 122.6 LBS | OXYGEN SATURATION: 95 % | HEART RATE: 58 BPM | BODY MASS INDEX: 21.72 KG/M2 | HEIGHT: 63 IN | DIASTOLIC BLOOD PRESSURE: 75 MMHG | RESPIRATION RATE: 18 BRPM | SYSTOLIC BLOOD PRESSURE: 121 MMHG | TEMPERATURE: 97.7 F

## 2023-05-02 ENCOUNTER — TRANSITIONAL CARE UNIT VISIT (OUTPATIENT)
Dept: GERIATRICS | Facility: CLINIC | Age: 78
End: 2023-05-02
Payer: COMMERCIAL

## 2023-05-02 DIAGNOSIS — J18.9 COMMUNITY ACQUIRED BILATERAL LOWER LOBE PNEUMONIA: ICD-10-CM

## 2023-05-02 DIAGNOSIS — I10 PRIMARY HYPERTENSION: ICD-10-CM

## 2023-05-02 DIAGNOSIS — R52 PAIN: ICD-10-CM

## 2023-05-02 DIAGNOSIS — R62.7 FAILURE TO THRIVE IN ADULT: Primary | ICD-10-CM

## 2023-05-02 DIAGNOSIS — F31.32 BIPOLAR AFFECTIVE DISORDER, CURRENTLY DEPRESSED, MODERATE (H): ICD-10-CM

## 2023-05-02 PROCEDURE — 99309 SBSQ NF CARE MODERATE MDM 30: CPT | Performed by: NURSE PRACTITIONER

## 2023-05-02 NOTE — PROGRESS NOTES
"[facility] GERIATRICS    Chief Complaint   Patient presents with     RECHECK     HPI:  Aliyah Gonzalez is a 78 year old  (1945), who is being seen today for an episodic care visit at: [facility] (U) [[id number]].     This is a 77 yo female with PMHx for hypertension, celiac disease, generalized anxiety disorder, bipolarism, who was previously hospitalized from 2/16-2/22/23 per falls weakness and UTI, then presented again during this admission for CAP.  She had a cough, more shortness of breath and hypoglycemia.  Of note apparently has difficulty with failure to thrive issues.  Initially required high flow oxygen, did not tolerate BiPAP, CTA negative for PE, was found to have diffuse bilateral opacities, cefepime and Vanco initially given, then discontinued after 7-day course.  Oxygen needs significantly decreased and was discharged on room air.  Also found to have a right heel stage II pressure ulcer was given dressing orders.  No other changes noted, discharged to [facility] for rehab.    Today's concern is:  Therapy progress    Allergies, and PMH/PSH reviewed in [facility] today.  REVIEW OF SYSTEMS:  4 point ROS including Respiratory, CV, GI and , other than that noted in the HPI,  is negative    Objective:   /75   Pulse 58   Temp 97.7  F (36.5  C)   Resp 18   Ht 1.6 m (5' 3\")   Wt 55.6 kg (122 lb 9.6 oz)   SpO2 95%   BMI 21.72 kg/m    GENERAL APPEARANCE:  Alert, in no distress, thin, cooperative, denies pain  RESP:  CTA, RA  CV:  Palpation and auscultation of heart done , regular rate and rhythm, no murmur, rub, or gallop, no edema  PSYCH:  oriented to 2. SLUMS 11/30      Most Recent 3 CBC's:  Recent Labs   Lab Test 04/18/23  0605 04/01/20  1103 03/31/20  1506   WBC 9.7 10.6 8.2   HGB 10.1* 12.1 13.1   MCV 97 94 94   * 390 466*     Most Recent 3 BMP's:  Recent Labs   Lab Test 04/18/23  0605 04/01/20  1103 03/31/20  1506    132* 128*   POTASSIUM 4.1 3.9 3.1*   CHLORIDE 101 105 " 99   CO2 23 21 20   BUN 42.1* 14 11   CR 0.88 0.86 0.77   ANIONGAP 12 6 9   GIA 9.6 8.8 8.9   GLC 84 74 124*       Assessment/Plan:    (J18.9) Community acquired bilateral lower lobe pneumonia    Initially required HFNC and given cefepime and Vanco, all discontinued prior to discharge, room air.  Resolved. F/u with pulmonary on 6/20 with CT prior     (I10) Primary hypertension  Not on medication, -120, stable     (R62.7) Failure to thrive in adult  Encourage protein supplement 3 times daily, weights 2 times weekly, 122lb (stable). Dietitian following     Stage II pressure ulcer on heel, right  Stabbed over, intact     Pain  Per no pain issues, today will discontinue lyrica     Renal function  Last CR 0.88 with GFR >60 on 4/18     (D72.825) Bandemia  Last WBC 9.7 on 4/18     (K59.01) Slow transit constipation  Continue miralax daily, no issue     (K90.0) Celiac disease  Continue gluten-free diet, no issues reported     (G43.719) Intractable chronic migraine without aura and without status migrainosus  (F41.1) LYN (generalized anxiety disorder)  (F31.32) Bipolar affective disorder, currently depressed, moderate (H)  Insomnia  Continue zypreza 15mg at HS, prior discontinued clonazepam and ativan per daughter request. Using melatonin 6mg at HS. Continue topamax 50mg BID. No issues reported     Therapy  Ambulating >300ft with 4WW, SBA. DEZ 46/56. Soon to discharge    Orders:  Discontinue lyrica    Electronically signed by: Be Soni NP

## 2023-05-03 ENCOUNTER — HOSPITAL ENCOUNTER (OUTPATIENT)
Dept: WOUND CARE | Facility: CLINIC | Age: 78
Discharge: HOME | End: 2023-05-03
Attending: PHYSICIAN ASSISTANT
Payer: COMMERCIAL

## 2023-05-03 VITALS
WEIGHT: 124 LBS | HEIGHT: 63 IN | HEART RATE: 58 BPM | SYSTOLIC BLOOD PRESSURE: 112 MMHG | TEMPERATURE: 97.6 F | DIASTOLIC BLOOD PRESSURE: 73 MMHG | OXYGEN SATURATION: 99 % | RESPIRATION RATE: 16 BRPM | BODY MASS INDEX: 21.97 KG/M2

## 2023-05-03 DIAGNOSIS — L89.623 DECUBITUS ULCER OF LEFT HEEL, STAGE 3 (H): ICD-10-CM

## 2023-05-03 PROCEDURE — 99304 1ST NF CARE SF/LOW MDM 25: CPT | Performed by: PHYSICIAN ASSISTANT

## 2023-05-04 ENCOUNTER — DISCHARGE SUMMARY NURSING HOME (OUTPATIENT)
Dept: GERIATRICS | Facility: CLINIC | Age: 78
End: 2023-05-04
Payer: COMMERCIAL

## 2023-05-04 DIAGNOSIS — Z98.84 H/O GASTRIC BYPASS: ICD-10-CM

## 2023-05-04 DIAGNOSIS — R52 PAIN: Primary | ICD-10-CM

## 2023-05-04 DIAGNOSIS — R62.7 FAILURE TO THRIVE IN ADULT: ICD-10-CM

## 2023-05-04 DIAGNOSIS — R25.1 TREMOR: ICD-10-CM

## 2023-05-04 DIAGNOSIS — J18.9 COMMUNITY ACQUIRED BILATERAL LOWER LOBE PNEUMONIA: ICD-10-CM

## 2023-05-04 DIAGNOSIS — R62.7 ADULT FAILURE TO THRIVE: ICD-10-CM

## 2023-05-04 DIAGNOSIS — F31.32 BIPOLAR AFFECTIVE DISORDER, CURRENTLY DEPRESSED, MODERATE (H): ICD-10-CM

## 2023-05-04 DIAGNOSIS — I10 PRIMARY HYPERTENSION: ICD-10-CM

## 2023-05-04 DIAGNOSIS — K59.01 SLOW TRANSIT CONSTIPATION: ICD-10-CM

## 2023-05-04 PROCEDURE — 99316 NF DSCHRG MGMT 30 MIN+: CPT | Performed by: NURSE PRACTITIONER

## 2023-05-04 RX ORDER — OLANZAPINE 15 MG/1
15 TABLET ORAL AT BEDTIME
Qty: 30 TABLET | Refills: 0 | Status: SHIPPED | OUTPATIENT
Start: 2023-05-04

## 2023-05-04 RX ORDER — DICYCLOMINE HCL 20 MG
20 TABLET ORAL 2 TIMES DAILY
Qty: 60 TABLET | Refills: 0 | Status: SHIPPED | OUTPATIENT
Start: 2023-05-04

## 2023-05-04 RX ORDER — TOPIRAMATE 50 MG/1
50 TABLET, FILM COATED ORAL 2 TIMES DAILY
Qty: 60 TABLET | Refills: 0 | Status: SHIPPED | OUTPATIENT
Start: 2023-05-04

## 2023-05-04 RX ORDER — PRIMIDONE 50 MG/1
50 TABLET ORAL 2 TIMES DAILY
Qty: 60 TABLET | Refills: 0 | Status: SHIPPED | OUTPATIENT
Start: 2023-05-04

## 2023-05-04 NOTE — PROGRESS NOTES
[facility] GERIATRICS DISCHARGE SUMMARY  PATIENT'S NAME: Aliyah Gonzalez  YOB: 1945  MEDICAL RECORD NUMBER:  [id number]  Place of Service where encounter took place:  [facility] (TCU) [[id number]]    PRIMARY CARE PROVIDER AND CLINIC RESPONSIBLE AFTER TRANSFER:   Marcia Blackwell MD, [facility] [address] [facility] [address] / [place] [address]    Non-[facility] Provider     Transferring providers: Be Soni NP, Dr. Judit MD  Recent Hospitalization/ED:  Hospital  [facility]  stay 4/1/23 to 4/15/23.  Date of SNF Admission: 4/15/23  Date of SNF (anticipated) Discharge: May 06, 2023  Discharged to: with family   Cognitive Scores: SLUMS: 11/30  Physical Function: Ambulating >300 ft with 4WW  DME: No new DME needed    CODE STATUS/ADVANCE DIRECTIVES DISCUSSION:  Prior   ALLERGIES: Atenolol, Ciprofloxacin, Codeine sulfate, Propoxyphene, Sulfa antibiotics, and Venlafaxine    HPI  This is a 79 yo female with PMHx for hypertension, celiac disease, generalized anxiety disorder, bipolarism, who was previously hospitalized from 2/16-2/22/23 per falls weakness and UTI, then presented again during this admission for CAP.  She had a cough, more shortness of breath and hypoglycemia.  Of note apparently has difficulty with failure to thrive issues.  Initially required high flow oxygen, did not tolerate BiPAP, CTA negative for PE, was found to have diffuse bilateral opacities, cefepime and Vanco initially given, then discontinued after 7-day course.  Oxygen needs significantly decreased and was discharged on room air.  Also found to have a right heel stage II pressure ulcer was given dressing orders.  No other changes noted, discharged to [facility] for rehab.    Summary of nursing facility stay:     (J18.9) Community acquired bilateral lower lobe pneumonia    Initially required HFNC and given cefepime and Vanco, all discontinued prior to discharge, room air.  Resolved. F/u with pulmonary on 6/20  with CT prior     (I10) Primary hypertension  Not on medication, -120     (R62.7) Failure to thrive in adult  Encourage protein supplement 3 times daily, weights 2 times weekly, 122lb (stable)     Stage II pressure ulcer on heel, right  Stabbed over, intact     Pain  Per no pain issues, prior discontinued lyrica     Renal function  Last CR 0.88 with GFR >60 on 4/18     (D72.825) Bandemia  Last WBC 9.7 on 4/18     (K59.01) Slow transit constipation  Continue miralax daily     (K90.0) Celiac disease  Continue gluten-free diet, no issues reported     (G43.719) Intractable chronic migraine without aura and without status migrainosus  (F41.1) LYN (generalized anxiety disorder)  (F31.32) Bipolar affective disorder, currently depressed, moderate (H)  Insomnia  Continue zypreza 15mg at HS, prior discontinued clonazepam and ativan per daughter request. Using melatonin 6mg at HS. Continue topamax 50mg BID    Discharge Medications:    Current Outpatient Medications   Medication Sig Dispense Refill     acetaminophen (TYLENOL) 500 MG tablet Take 1,000 mg by mouth 3 times daily as needed for mild pain       cholecalciferol (VITAMIN D3) 125 mcg (5000 units) capsule Take 125 mcg by mouth daily       cycloSPORINE (RESTASIS) 0.05 % ophthalmic emulsion Place 1 drop into both eyes 2 times daily       dicyclomine (BENTYL) 20 MG tablet Take 20 mg by mouth 2 times daily       esomeprazole (NEXIUM) 20 MG DR capsule Take 20 mg by mouth every morning (before breakfast) Take 30-60 minutes before eating.       FLUoxetine HCl 60 MG TABS Take 60 mg by mouth daily       fluticasone (FLONASE) 50 MCG/ACT nasal spray Spray 2 sprays into both nostrils daily       melatonin 3 MG tablet Take 6 mg by mouth At Bedtime       OLANZapine (ZYPREXA) 15 MG tablet Take 15 mg by mouth At Bedtime       Omega-3 Fatty Acids (OMEGA-3 FISH OIL PO) Take 1 g by mouth daily (Patient not taking: Reported on 4/17/2023)       polyethylene glycol (MIRALAX) 17 g  "packet Take 17 g by mouth daily       primidone (MYSOLINE) 50 MG tablet Take 50 mg by mouth 2 times daily       rizatriptan (MAXALT) 10 MG tablet Take 10 mg by mouth at onset of headache for migraine       thiamine 50 MG TABS Take 50 mg by mouth daily       topiramate (TOPAMAX) 50 MG tablet Take 50 mg by mouth 2 times daily         Controlled medications:   not applicable/none     Past Medical History:   Past Medical History:   Diagnosis Date     Arthritis      Hypertension      Physical Exam:   Vitals: /73   Pulse 58   Temp 97.6  F (36.4  C)   Resp 16   Ht 1.6 m (5' 3\")   Wt 56.2 kg (124 lb)   SpO2 99%   BMI 21.97 kg/m    BMI: Body mass index is 21.97 kg/m .  GENERAL APPEARANCE:  Alert, in no distress, thin, cooperative, denies pain  RESP:  CTA, RA  CV:  Palpation and auscultation of heart done , regular rate and rhythm, no murmur, rub, or gallop, no edema  PSYCH:  oriented to 2. [facility] 11/30    SNF labs:   Most Recent 3 CBC's:Recent Labs   Lab Test 04/18/23  0605 04/01/20  1103 03/31/20  1506   WBC 9.7 10.6 8.2   HGB 10.1* 12.1 13.1   MCV 97 94 94   * 390 466*     Most Recent 3 BMP's:Recent Labs   Lab Test 04/18/23  0605 04/01/20  1103 03/31/20  1506    132* 128*   POTASSIUM 4.1 3.9 3.1*   CHLORIDE 101 105 99   CO2 23 21 20   BUN 42.1* 14 11   CR 0.88 0.86 0.77   ANIONGAP 12 6 9   GIA 9.6 8.8 8.9   GLC 84 74 124*       DISCHARGE PLAN:    Follow up labs: No labs orders/due    Medical Follow Up:      Follow up with primary care provider in 1-2 weeks    [facility] scheduled appointments:       Discharge Services: Home Care:  Occupational Therapy, Physical Therapy, Registered Nurse, Home Health Aide and     Discharge Instructions Verbalized to Patient at Discharge:     None    TOTAL DISCHARGE TIME:   Greater than 30 minutes  Electronically signed by:  Be Soni NP     Documentation of Face to Face and Certification for Home Health Services    I certify that patient: " Aliyah Gonzalez is under my care and that I, or a nurse practitioner or physician's assistant working with me, had a face-to-face encounter that meets the physician face-to-face encounter requirements with this patient on: 5/4/2023.    This encounter with the patient was in whole, or in part, for the following medical condition, which is the primary reason for home health care: medication management.    I certify that, based on my findings, the following services are medically necessary home health services: Nursing, Occupational Therapy, Physical Therapy and Social Work.    My clinical findings support the need for the above services because: Nurse is needed: To provide assessment and oversight required in the home to assure adherence to the medical plan due to: medication management.., Occupational Therapy Services are needed to assess and treat cognitive ability and address ADL safety due to impairment in cognition., Physical Therapy Services are needed to assess and treat the following functional impairments: falls risk. and  Services are needed to assess and treat due to resource allocation.    Further, I certify that my clinical findings support that this patient is homebound (i.e. absences from home require considerable and taxing effort and are for medical reasons or [employer] services or infrequently or of short duration when for other reasons) because: Requires assistance of another person or specialized equipment to access medical services because patient: Is prone to wander/get lost without assistance. and Requires supervision of another for safe transfer...    Based on the above findings. I certify that this patient is confined to the home and needs intermittent skilled nursing care, physical therapy and/or speech therapy.  The patient is under my care, and I have initiated the establishment of the plan of care.  This patient will be followed by a physician who will periodically review  the plan of care.  Physician/Provider to provide follow up care: Marcia Blackwell    Attending hospital physician (the Medicare certified [facility] provider): Be Soni NP  Physician Signature: See electronic signature associated with these discharge orders.  Date: 5/4/2023

## 2023-05-10 NOTE — PROGRESS NOTES
WOUND VISIT AT [facility]    ASSESSMENT:   1. Stage 3 left heel pressure ulcer    PLAN/DISCUSSION:   1. Wound care plan: Cleanse with wound cleanser. Cover with Mepilex, or other silicone foam. Change three times a week and PRN.   2. Encourage offloading boots and floating heels    HISTORY OF PRESENT ILLNESS:   Aliyah Gonzalez is a 78 year old female who is seen at [facility] today. Was recently hospitalized at [facility] from 4/1-4/15 for pneumonia and FTT. I am seeing this patient today for a left heel pressure ulcer. This was present upon admission at hospital. Staff at [facility] notes that it has been improving.    Pmhx notable for cognitive impairment, HTN, bipolar disorder, celiac disorder.    TREATMENT COURSE:  5/3/2023 : Initial visit at [facility].     MATTRESS:  Group 2 mattress    PHYSICAL EXAM:  GENERAL: Patient is alert and oriented and in no acute distress  CV: absent pedal pulses  INTEGUMENTARY:     PROCEDURE: Mechanical debridement (no-charge) was performed with cleansing and changing of the dressing by the nursing staff.    MDM: 30 minutes were spent on the date of the visit reviewing previous chart notes, evaluating patient and developing the treatment plan, this excludes any time spent on procedures    Electronically signed by Lotus Perez PA-C on May 10, 2023